# Patient Record
Sex: FEMALE | Race: WHITE | NOT HISPANIC OR LATINO
[De-identification: names, ages, dates, MRNs, and addresses within clinical notes are randomized per-mention and may not be internally consistent; named-entity substitution may affect disease eponyms.]

---

## 2018-04-02 ENCOUNTER — IMAGING SERVICES (OUTPATIENT)
Dept: OTHER | Age: 48
End: 2018-04-02

## 2018-04-02 ENCOUNTER — CHARTING TRANS (OUTPATIENT)
Dept: OTHER | Age: 48
End: 2018-04-02

## 2018-04-16 ENCOUNTER — CHARTING TRANS (OUTPATIENT)
Dept: OTHER | Age: 48
End: 2018-04-16

## 2018-04-17 ENCOUNTER — CHARTING TRANS (OUTPATIENT)
Dept: OTHER | Age: 48
End: 2018-04-17

## 2018-04-20 ENCOUNTER — CHARTING TRANS (OUTPATIENT)
Dept: OTHER | Age: 48
End: 2018-04-20

## 2018-04-23 ENCOUNTER — CHARTING TRANS (OUTPATIENT)
Dept: OTHER | Age: 48
End: 2018-04-23

## 2018-05-02 ENCOUNTER — CHARTING TRANS (OUTPATIENT)
Dept: OTHER | Age: 48
End: 2018-05-02

## 2018-05-02 ENCOUNTER — MYAURORA ACCOUNT LINK (OUTPATIENT)
Dept: OTHER | Age: 48
End: 2018-05-02

## 2018-05-04 ENCOUNTER — MYAURORA ACCOUNT LINK (OUTPATIENT)
Dept: OTHER | Age: 48
End: 2018-05-04

## 2018-05-04 ENCOUNTER — CHARTING TRANS (OUTPATIENT)
Dept: OTHER | Age: 48
End: 2018-05-04

## 2018-05-09 ENCOUNTER — CHARTING TRANS (OUTPATIENT)
Dept: OTHER | Age: 48
End: 2018-05-09

## 2018-05-16 ENCOUNTER — IMAGING SERVICES (OUTPATIENT)
Dept: OTHER | Age: 48
End: 2018-05-16

## 2018-05-18 ENCOUNTER — CHARTING TRANS (OUTPATIENT)
Dept: OTHER | Age: 48
End: 2018-05-18

## 2018-06-06 ENCOUNTER — CHARTING TRANS (OUTPATIENT)
Dept: OTHER | Age: 48
End: 2018-06-06

## 2018-07-06 ENCOUNTER — CHARTING TRANS (OUTPATIENT)
Dept: OTHER | Age: 48
End: 2018-07-06

## 2018-07-06 ENCOUNTER — MYAURORA ACCOUNT LINK (OUTPATIENT)
Dept: OTHER | Age: 48
End: 2018-07-06

## 2018-07-11 ENCOUNTER — CHARTING TRANS (OUTPATIENT)
Dept: OTHER | Age: 48
End: 2018-07-11

## 2019-03-06 VITALS — HEIGHT: 64 IN | BODY MASS INDEX: 31.07 KG/M2

## 2019-06-05 ENCOUNTER — OFFICE VISIT (OUTPATIENT)
Dept: INTERNAL MEDICINE | Age: 49
End: 2019-06-05

## 2019-06-05 ENCOUNTER — LAB SERVICES (OUTPATIENT)
Dept: LAB | Age: 49
End: 2019-06-05

## 2019-06-05 VITALS
WEIGHT: 197 LBS | RESPIRATION RATE: 18 BRPM | SYSTOLIC BLOOD PRESSURE: 118 MMHG | BODY MASS INDEX: 33.63 KG/M2 | TEMPERATURE: 98.8 F | HEART RATE: 96 BPM | HEIGHT: 64 IN | DIASTOLIC BLOOD PRESSURE: 72 MMHG

## 2019-06-05 DIAGNOSIS — R60.0 LOCALIZED EDEMA: Primary | ICD-10-CM

## 2019-06-05 DIAGNOSIS — R60.0 LOCALIZED EDEMA: ICD-10-CM

## 2019-06-05 LAB
BUN SERPL-MCNC: 9 MG/DL (ref 7–20)
CALCIUM SERPL-MCNC: 9.5 MG/DL (ref 8.6–10.6)
CHLORIDE SERPL-SCNC: 103 MMOL/L (ref 96–107)
CO2 SERPL-SCNC: 25 MMOL/L (ref 22–32)
CREAT SERPL-MCNC: 0.8 MG/DL (ref 0.5–1.4)
GFR SERPL CREATININE-BSD FRML MDRD: >60 ML/MIN/{1.73M2}
GFR SERPL CREATININE-BSD FRML MDRD: >60 ML/MIN/{1.73M2}
GLUCOSE SERPL-MCNC: 147 MG/DL (ref 70–200)
POTASSIUM SERPL-SCNC: 4.5 MMOL/L (ref 3.5–5.3)
SODIUM SERPL-SCNC: 138 MMOL/L (ref 136–146)

## 2019-06-05 PROCEDURE — 36415 COLL VENOUS BLD VENIPUNCTURE: CPT | Performed by: PHYSICIAN ASSISTANT

## 2019-06-05 PROCEDURE — 99203 OFFICE O/P NEW LOW 30 MIN: CPT | Performed by: PHYSICIAN ASSISTANT

## 2019-06-05 PROCEDURE — 82043 UR ALBUMIN QUANTITATIVE: CPT | Performed by: PHYSICIAN ASSISTANT

## 2019-06-05 PROCEDURE — 80048 BASIC METABOLIC PNL TOTAL CA: CPT | Performed by: PHYSICIAN ASSISTANT

## 2019-06-05 PROCEDURE — 82570 ASSAY OF URINE CREATININE: CPT | Performed by: PHYSICIAN ASSISTANT

## 2019-06-06 LAB
ALBUMIN/CREAT UR: 4.7 MG/G (ref 0–30)
CREAT UR-MCNC: 105.5 MG/DL
MICROALBUMIN UR-MCNC: 5 MG/L

## 2019-10-29 ENCOUNTER — OFFICE VISIT (OUTPATIENT)
Dept: INTERNAL MEDICINE | Age: 49
End: 2019-10-29

## 2019-10-29 VITALS
DIASTOLIC BLOOD PRESSURE: 84 MMHG | OXYGEN SATURATION: 98 % | SYSTOLIC BLOOD PRESSURE: 126 MMHG | HEART RATE: 95 BPM | TEMPERATURE: 98.3 F

## 2019-10-29 DIAGNOSIS — J40 BRONCHITIS: Primary | ICD-10-CM

## 2019-10-29 PROCEDURE — 99214 OFFICE O/P EST MOD 30 MIN: CPT | Performed by: PHYSICIAN ASSISTANT

## 2019-10-29 RX ORDER — AZITHROMYCIN 250 MG/1
TABLET, FILM COATED ORAL
Qty: 6 TABLET | Refills: 0 | Status: SHIPPED | OUTPATIENT
Start: 2019-10-29 | End: 2020-04-02 | Stop reason: ALTCHOICE

## 2019-10-29 RX ORDER — CODEINE PHOSPHATE AND GUAIFENESIN 10; 100 MG/5ML; MG/5ML
5 SOLUTION ORAL 3 TIMES DAILY PRN
Qty: 120 ML | Refills: 0 | Status: SHIPPED | OUTPATIENT
Start: 2019-10-29 | End: 2020-04-02 | Stop reason: ALTCHOICE

## 2020-01-31 ENCOUNTER — E-ADVICE (OUTPATIENT)
Dept: INTERNAL MEDICINE | Age: 50
End: 2020-01-31

## 2020-04-02 ENCOUNTER — OFFICE VISIT (OUTPATIENT)
Dept: INTERNAL MEDICINE | Age: 50
End: 2020-04-02

## 2020-04-02 DIAGNOSIS — Z71.6 ENCOUNTER FOR SMOKING CESSATION COUNSELING: Primary | ICD-10-CM

## 2020-04-02 PROBLEM — M25.572 CHRONIC PAIN OF LEFT ANKLE: Status: ACTIVE | Noted: 2018-05-18

## 2020-04-02 PROBLEM — R26.2 DIFFICULTY WALKING: Status: ACTIVE | Noted: 2018-04-17

## 2020-04-02 PROBLEM — G89.29 CHRONIC PAIN OF LEFT ANKLE: Status: ACTIVE | Noted: 2018-05-18

## 2020-04-02 PROBLEM — B35.1 DERMATOPHYTOSIS OF NAIL: Status: ACTIVE | Noted: 2018-04-16

## 2020-04-02 PROBLEM — M65.9 SYNOVITIS OF ANKLE: Status: ACTIVE | Noted: 2018-04-02

## 2020-04-02 PROBLEM — M62.81 MUSCLE WEAKNESS: Status: ACTIVE | Noted: 2018-04-17

## 2020-04-02 PROBLEM — M79.675 GREAT TOE PAIN, LEFT: Status: ACTIVE | Noted: 2018-04-16

## 2020-04-02 PROBLEM — M65.979 SYNOVITIS OF ANKLE: Status: ACTIVE | Noted: 2018-04-02

## 2020-04-02 PROBLEM — S93.492A SPRAIN OF ANTERIOR TALOFIBULAR LIGAMENT OF LEFT ANKLE: Status: ACTIVE | Noted: 2018-04-02

## 2020-04-02 PROBLEM — L60.3 NAIL DYSTROPHY: Status: ACTIVE | Noted: 2018-04-16

## 2020-04-02 PROCEDURE — 99442 TELEPHONE E&M BY PHYSICIAN EST PT NOT ORIG PREV 7 DAYS 11-20 MIN: CPT | Performed by: PHYSICIAN ASSISTANT

## 2020-04-02 RX ORDER — VARENICLINE TARTRATE 1 MG/1
1 TABLET, FILM COATED ORAL 2 TIMES DAILY
Qty: 60 TABLET | Refills: 1 | Status: SHIPPED | OUTPATIENT
Start: 2020-04-02 | End: 2022-09-29 | Stop reason: ALTCHOICE

## 2020-05-05 ENCOUNTER — V-VISIT (OUTPATIENT)
Dept: INTERNAL MEDICINE | Age: 50
End: 2020-05-05

## 2020-05-05 ENCOUNTER — E-ADVICE (OUTPATIENT)
Dept: INTERNAL MEDICINE | Age: 50
End: 2020-05-05

## 2020-05-05 DIAGNOSIS — L03.032 PARONYCHIA OF GREAT TOE OF LEFT FOOT: Primary | ICD-10-CM

## 2020-05-05 PROCEDURE — 99214 OFFICE O/P EST MOD 30 MIN: CPT | Performed by: PHYSICIAN ASSISTANT

## 2020-05-05 RX ORDER — CEPHALEXIN 500 MG/1
500 CAPSULE ORAL 4 TIMES DAILY
Qty: 28 CAPSULE | Refills: 0 | Status: SHIPPED | OUTPATIENT
Start: 2020-05-05 | End: 2020-05-12

## 2020-05-05 ASSESSMENT — PATIENT HEALTH QUESTIONNAIRE - PHQ9
SUM OF ALL RESPONSES TO PHQ9 QUESTIONS 1 AND 2: 0
1. LITTLE INTEREST OR PLEASURE IN DOING THINGS: NOT AT ALL
SUM OF ALL RESPONSES TO PHQ9 QUESTIONS 1 AND 2: 0
2. FEELING DOWN, DEPRESSED OR HOPELESS: NOT AT ALL

## 2020-10-19 ENCOUNTER — WALK IN (OUTPATIENT)
Dept: URGENT CARE | Age: 50
End: 2020-10-19

## 2020-10-19 DIAGNOSIS — Z20.828 CONTACT W AND EXPOSURE TO OTH VIRAL COMMUNICABLE DISEASES: ICD-10-CM

## 2020-10-19 DIAGNOSIS — R05.9 COUGH: Primary | ICD-10-CM

## 2020-10-19 DIAGNOSIS — R09.81 SINUS CONGESTION: ICD-10-CM

## 2020-10-19 LAB — SARS-COV-2 AG RESP QL IA.RAPID: NOT DETECTED

## 2020-10-19 PROCEDURE — 87426 SARSCOV CORONAVIRUS AG IA: CPT | Performed by: FAMILY MEDICINE

## 2020-10-19 PROCEDURE — 99203 OFFICE O/P NEW LOW 30 MIN: CPT | Performed by: FAMILY MEDICINE

## 2022-09-29 ENCOUNTER — OFFICE VISIT (OUTPATIENT)
Dept: INTERNAL MEDICINE | Age: 52
End: 2022-09-29

## 2022-09-29 VITALS
TEMPERATURE: 98.2 F | WEIGHT: 209.2 LBS | OXYGEN SATURATION: 100 % | DIASTOLIC BLOOD PRESSURE: 86 MMHG | BODY MASS INDEX: 35.71 KG/M2 | RESPIRATION RATE: 16 BRPM | HEIGHT: 64 IN | HEART RATE: 87 BPM | SYSTOLIC BLOOD PRESSURE: 136 MMHG

## 2022-09-29 DIAGNOSIS — L98.8 SKIN PLAQUE: ICD-10-CM

## 2022-09-29 DIAGNOSIS — R12 HEARTBURN: Primary | ICD-10-CM

## 2022-09-29 PROCEDURE — 99214 OFFICE O/P EST MOD 30 MIN: CPT | Performed by: PHYSICIAN ASSISTANT

## 2022-09-29 RX ORDER — BETAMETHASONE DIPROPIONATE 0.05 %
1 OINTMENT (GRAM) TOPICAL 2 TIMES DAILY
Qty: 30 G | Refills: 1 | Status: SHIPPED | OUTPATIENT
Start: 2022-09-29 | End: 2023-04-21

## 2022-09-29 RX ORDER — OMEPRAZOLE 40 MG/1
40 CAPSULE, DELAYED RELEASE ORAL DAILY
Qty: 30 CAPSULE | Refills: 1 | Status: SHIPPED | OUTPATIENT
Start: 2022-09-29 | End: 2022-12-27

## 2022-09-29 ASSESSMENT — PATIENT HEALTH QUESTIONNAIRE - PHQ9
SUM OF ALL RESPONSES TO PHQ9 QUESTIONS 1 AND 2: 0
2. FEELING DOWN, DEPRESSED OR HOPELESS: NOT AT ALL
CLINICAL INTERPRETATION OF PHQ2 SCORE: NO FURTHER SCREENING NEEDED
SUM OF ALL RESPONSES TO PHQ9 QUESTIONS 1 AND 2: 0
1. LITTLE INTEREST OR PLEASURE IN DOING THINGS: NOT AT ALL

## 2022-10-03 ENCOUNTER — APPOINTMENT (OUTPATIENT)
Dept: DERMATOLOGY | Age: 52
End: 2022-10-03

## 2022-12-05 ENCOUNTER — OFFICE VISIT (OUTPATIENT)
Dept: DERMATOLOGY | Age: 52
End: 2022-12-05
Attending: PHYSICIAN ASSISTANT

## 2022-12-05 DIAGNOSIS — L30.9 ECZEMA, UNSPECIFIED TYPE: Primary | ICD-10-CM

## 2022-12-05 PROCEDURE — 99203 OFFICE O/P NEW LOW 30 MIN: CPT | Performed by: DERMATOLOGY

## 2022-12-05 RX ORDER — HALOBETASOL PROPIONATE 0.05 %
OINTMENT (GRAM) TOPICAL 2 TIMES DAILY
Qty: 50 G | Refills: 1 | Status: SHIPPED | OUTPATIENT
Start: 2022-12-05 | End: 2023-07-19 | Stop reason: SDUPTHER

## 2022-12-27 RX ORDER — OMEPRAZOLE 40 MG/1
CAPSULE, DELAYED RELEASE ORAL
Qty: 30 CAPSULE | Refills: 11 | Status: SHIPPED | OUTPATIENT
Start: 2022-12-27

## 2023-02-08 ENCOUNTER — APPOINTMENT (OUTPATIENT)
Dept: GASTROENTEROLOGY | Age: 53
End: 2023-02-08
Attending: PHYSICIAN ASSISTANT

## 2023-04-21 RX ORDER — BETAMETHASONE DIPROPIONATE 0.05 %
OINTMENT (GRAM) TOPICAL
Qty: 30 G | Refills: 1 | Status: SHIPPED | OUTPATIENT
Start: 2023-04-21 | End: 2023-08-14

## 2023-07-19 RX ORDER — HALOBETASOL PROPIONATE 0.05 %
OINTMENT (GRAM) TOPICAL 2 TIMES DAILY
Qty: 50 G | Refills: 0 | Status: SHIPPED | OUTPATIENT
Start: 2023-07-19

## 2023-07-26 ENCOUNTER — TELEPHONE (OUTPATIENT)
Dept: GASTROENTEROLOGY | Age: 53
End: 2023-07-26

## 2023-07-26 ENCOUNTER — OFFICE VISIT (OUTPATIENT)
Dept: GASTROENTEROLOGY | Age: 53
End: 2023-07-26
Attending: PHYSICIAN ASSISTANT

## 2023-07-26 VITALS — HEIGHT: 64 IN | WEIGHT: 178 LBS | BODY MASS INDEX: 30.39 KG/M2

## 2023-07-26 DIAGNOSIS — K21.9 CHRONIC GERD: Primary | ICD-10-CM

## 2023-07-26 DIAGNOSIS — Z80.0 FHX: COLON CANCER: ICD-10-CM

## 2023-07-26 DIAGNOSIS — Z12.11 SPECIAL SCREENING FOR MALIGNANT NEOPLASMS, COLON: ICD-10-CM

## 2023-07-26 PROCEDURE — 99204 OFFICE O/P NEW MOD 45 MIN: CPT | Performed by: INTERNAL MEDICINE

## 2023-07-26 ASSESSMENT — ENCOUNTER SYMPTOMS
BLOOD IN STOOL: 0
COLOR CHANGE: 0
CONFUSION: 0
RECTAL PAIN: 0
APPETITE CHANGE: 0
DIARRHEA: 0
SHORTNESS OF BREATH: 0
ABDOMINAL PAIN: 0
CHEST TIGHTNESS: 0
HEADACHES: 0
BACK PAIN: 0
SPEECH DIFFICULTY: 0
CHOKING: 0
ABDOMINAL DISTENTION: 0
TREMORS: 0
CHILLS: 0
CONSTIPATION: 0
SORE THROAT: 0
UNEXPECTED WEIGHT CHANGE: 0
EYE PAIN: 0
FATIGUE: 0
SEIZURES: 0
LIGHT-HEADEDNESS: 0
BRUISES/BLEEDS EASILY: 0
ANAL BLEEDING: 0
DIAPHORESIS: 0
NAUSEA: 0
EYE REDNESS: 0
VOMITING: 0
COUGH: 0

## 2023-08-14 RX ORDER — BETAMETHASONE DIPROPIONATE 0.05 %
OINTMENT (GRAM) TOPICAL
Qty: 30 G | Refills: 1 | Status: SHIPPED | OUTPATIENT
Start: 2023-08-14

## 2023-09-19 ENCOUNTER — E-ADVICE (OUTPATIENT)
Dept: GASTROENTEROLOGY | Age: 53
End: 2023-09-19

## 2023-09-19 RX ORDER — BISACODYL 5 MG/1
TABLET, DELAYED RELEASE ORAL
Qty: 2 TABLET | Refills: 0 | Status: SHIPPED | OUTPATIENT
Start: 2023-09-19 | End: 2023-11-03

## 2023-09-19 RX ORDER — SIMETHICONE 125 MG
TABLET,CHEWABLE ORAL
Qty: 2 TABLET | Refills: 0 | Status: SHIPPED | OUTPATIENT
Start: 2023-09-19 | End: 2023-11-03

## 2023-11-02 ENCOUNTER — E-ADVICE (OUTPATIENT)
Dept: FAMILY MEDICINE | Age: 53
End: 2023-11-02

## 2023-11-03 NOTE — LETTER
25          Charlotte Laugh Robbie  :  1970      To Whom It May Concern:    This patient was seen in our office on 25 .  Work status:  {JOEL RETURN TO WORK:3391} May return to work status per above effective ***.    If this office may be of further assistance, please do not hesitate to contact us.      Sincerely,        Roxana Silva     present done

## 2023-11-08 RX ORDER — BETAMETHASONE DIPROPIONATE 0.05 %
OINTMENT (GRAM) TOPICAL
Qty: 30 G | Refills: 1 | OUTPATIENT
Start: 2023-11-08

## 2024-01-04 ENCOUNTER — E-ADVICE (OUTPATIENT)
Dept: FAMILY MEDICINE | Age: 54
End: 2024-01-04

## 2024-01-08 RX ORDER — OMEPRAZOLE 40 MG/1
CAPSULE, DELAYED RELEASE ORAL
Qty: 90 CAPSULE | Refills: 3 | OUTPATIENT
Start: 2024-01-08

## 2024-01-16 ENCOUNTER — ANESTHESIA EVENT (OUTPATIENT)
Dept: GASTROENTEROLOGY | Age: 54
End: 2024-01-16

## 2024-01-17 ENCOUNTER — ANESTHESIA (OUTPATIENT)
Dept: GASTROENTEROLOGY | Age: 54
End: 2024-01-17

## 2024-01-17 ENCOUNTER — APPOINTMENT (OUTPATIENT)
Dept: GASTROENTEROLOGY | Age: 54
End: 2024-01-17
Attending: INTERNAL MEDICINE

## 2024-01-17 VITALS
SYSTOLIC BLOOD PRESSURE: 138 MMHG | HEART RATE: 84 BPM | BODY MASS INDEX: 30.39 KG/M2 | OXYGEN SATURATION: 99 % | DIASTOLIC BLOOD PRESSURE: 91 MMHG | WEIGHT: 178 LBS | RESPIRATION RATE: 20 BRPM | HEIGHT: 64 IN | TEMPERATURE: 97 F

## 2024-01-17 DIAGNOSIS — K63.5 POLYP OF COLON, UNSPECIFIED PART OF COLON, UNSPECIFIED TYPE: Primary | ICD-10-CM

## 2024-01-17 DIAGNOSIS — Z12.11 SPECIAL SCREENING FOR MALIGNANT NEOPLASMS, COLON: ICD-10-CM

## 2024-01-17 DIAGNOSIS — K21.9 CHRONIC GERD: ICD-10-CM

## 2024-01-17 LAB
B-HCG UR QL: NEGATIVE
COLONOSCOPY STUDY: NORMAL
INTERNAL PROCEDURAL CONTROLS ACCEPTABLE: YES
TEST LOT EXPIRATION DATE: NORMAL
TEST LOT NUMBER: NORMAL

## 2024-01-17 PROCEDURE — 45385 COLONOSCOPY W/LESION REMOVAL: CPT | Performed by: INTERNAL MEDICINE

## 2024-01-17 PROCEDURE — 43239 EGD BIOPSY SINGLE/MULTIPLE: CPT | Performed by: CLINIC/CENTER

## 2024-01-17 PROCEDURE — 45385 COLONOSCOPY W/LESION REMOVAL: CPT | Performed by: CLINIC/CENTER

## 2024-01-17 PROCEDURE — 88305 TISSUE EXAM BY PATHOLOGIST: CPT | Performed by: INTERNAL MEDICINE

## 2024-01-17 PROCEDURE — 43239 EGD BIOPSY SINGLE/MULTIPLE: CPT | Performed by: INTERNAL MEDICINE

## 2024-01-17 PROCEDURE — 45380 COLONOSCOPY AND BIOPSY: CPT | Performed by: CLINIC/CENTER

## 2024-01-17 PROCEDURE — 45380 COLONOSCOPY AND BIOPSY: CPT | Performed by: INTERNAL MEDICINE

## 2024-01-17 RX ORDER — SODIUM CHLORIDE 9 MG/ML
INJECTION, SOLUTION INTRAVENOUS CONTINUOUS
Status: DISCONTINUED | OUTPATIENT
Start: 2024-01-17 | End: 2024-01-19 | Stop reason: HOSPADM

## 2024-01-17 RX ORDER — LIDOCAINE HYDROCHLORIDE 10 MG/ML
INJECTION, SOLUTION INFILTRATION; PERINEURAL PRN
Status: DISCONTINUED | OUTPATIENT
Start: 2024-01-17 | End: 2024-01-17

## 2024-01-17 RX ORDER — SODIUM CHLORIDE, SODIUM LACTATE, POTASSIUM CHLORIDE, CALCIUM CHLORIDE 600; 310; 30; 20 MG/100ML; MG/100ML; MG/100ML; MG/100ML
INJECTION, SOLUTION INTRAVENOUS CONTINUOUS
Status: DISCONTINUED | OUTPATIENT
Start: 2024-01-17 | End: 2024-01-19 | Stop reason: HOSPADM

## 2024-01-17 RX ORDER — DEXTROSE MONOHYDRATE 50 MG/ML
INJECTION, SOLUTION INTRAVENOUS CONTINUOUS PRN
Status: DISCONTINUED | OUTPATIENT
Start: 2024-01-17 | End: 2024-01-19 | Stop reason: HOSPADM

## 2024-01-17 RX ORDER — LIDOCAINE HYDROCHLORIDE 10 MG/ML
5-10 INJECTION, SOLUTION INFILTRATION; PERINEURAL PRN
Status: DISCONTINUED | OUTPATIENT
Start: 2024-01-17 | End: 2024-01-19 | Stop reason: HOSPADM

## 2024-01-17 RX ORDER — PROPOFOL 10 MG/ML
INJECTION, EMULSION INTRAVENOUS PRN
Status: DISCONTINUED | OUTPATIENT
Start: 2024-01-17 | End: 2024-01-17

## 2024-01-17 RX ADMIN — PROPOFOL 100 MG: 10 INJECTION, EMULSION INTRAVENOUS at 09:40

## 2024-01-17 RX ADMIN — SODIUM CHLORIDE, SODIUM LACTATE, POTASSIUM CHLORIDE, CALCIUM CHLORIDE: 600; 310; 30; 20 INJECTION, SOLUTION INTRAVENOUS at 09:35

## 2024-01-17 RX ADMIN — PROPOFOL 100 MG: 10 INJECTION, EMULSION INTRAVENOUS at 09:46

## 2024-01-17 RX ADMIN — PROPOFOL 100 MG: 10 INJECTION, EMULSION INTRAVENOUS at 09:57

## 2024-01-17 RX ADMIN — LIDOCAINE HYDROCHLORIDE 50 MG: 10 INJECTION, SOLUTION INFILTRATION; PERINEURAL at 09:40

## 2024-01-17 RX ADMIN — PROPOFOL 150 MG: 10 INJECTION, EMULSION INTRAVENOUS at 10:02

## 2024-01-17 RX ADMIN — PROPOFOL 100 MG: 10 INJECTION, EMULSION INTRAVENOUS at 09:51

## 2024-01-17 ASSESSMENT — PAIN SCALES - GENERAL
PAINLEVEL_OUTOF10: 0
PAINLEVEL_OUTOF10: 0

## 2024-01-17 ASSESSMENT — LIFESTYLE VARIABLES: SMOKING_STATUS: CURRENT

## 2024-01-17 ASSESSMENT — ACTIVITIES OF DAILY LIVING (ADL)
ADL_SCORE: 12
ADL_BEFORE_ADMISSION: INDEPENDENT

## 2024-01-17 ASSESSMENT — ENCOUNTER SYMPTOMS: EXERCISE TOLERANCE: GOOD (>4 METS)

## 2024-01-22 LAB
ASR DISCLAIMER: NORMAL
CASE RPRT: NORMAL
CLINICAL INFO: NORMAL
PATH REPORT.FINAL DX SPEC: NORMAL
PATH REPORT.GROSS SPEC: NORMAL

## 2024-01-31 ENCOUNTER — CLINICAL DOCUMENTATION (OUTPATIENT)
Dept: GASTROENTEROLOGY | Age: 54
End: 2024-01-31

## 2024-02-28 RX ORDER — PANTOPRAZOLE SODIUM 40 MG/1
40 TABLET, DELAYED RELEASE ORAL DAILY
Qty: 90 TABLET | Refills: 3 | Status: SHIPPED | OUTPATIENT
Start: 2024-02-28

## 2024-08-05 ENCOUNTER — E-ADVICE (OUTPATIENT)
Dept: INTERNAL MEDICINE | Age: 54
End: 2024-08-05

## 2024-08-07 ENCOUNTER — HOSPITAL ENCOUNTER (OUTPATIENT)
Dept: GENERAL RADIOLOGY | Age: 54
Discharge: HOME | End: 2024-08-07
Attending: FAMILY MEDICINE
Payer: COMMERCIAL

## 2024-08-07 ENCOUNTER — OFFICE VISIT (OUTPATIENT)
Dept: FAMILY MEDICINE CLINIC | Facility: CLINIC | Age: 54
End: 2024-08-07
Payer: COMMERCIAL

## 2024-08-07 VITALS
HEART RATE: 94 BPM | TEMPERATURE: 98 F | WEIGHT: 210 LBS | RESPIRATION RATE: 18 BRPM | HEIGHT: 64 IN | DIASTOLIC BLOOD PRESSURE: 88 MMHG | SYSTOLIC BLOOD PRESSURE: 138 MMHG | OXYGEN SATURATION: 99 % | BODY MASS INDEX: 35.85 KG/M2

## 2024-08-07 DIAGNOSIS — M54.2 NECK PAIN: ICD-10-CM

## 2024-08-07 DIAGNOSIS — G89.29 CHRONIC SHOULDER PAIN, UNSPECIFIED LATERALITY: ICD-10-CM

## 2024-08-07 DIAGNOSIS — M54.2 NECK PAIN: Primary | ICD-10-CM

## 2024-08-07 DIAGNOSIS — M25.519 CHRONIC SHOULDER PAIN, UNSPECIFIED LATERALITY: ICD-10-CM

## 2024-08-07 PROBLEM — K21.9 CHRONIC GERD: Status: ACTIVE | Noted: 2023-07-26

## 2024-08-07 PROCEDURE — 99203 OFFICE O/P NEW LOW 30 MIN: CPT | Performed by: FAMILY MEDICINE

## 2024-08-07 PROCEDURE — 72050 X-RAY EXAM NECK SPINE 4/5VWS: CPT | Performed by: FAMILY MEDICINE

## 2024-08-07 RX ORDER — CYCLOBENZAPRINE HCL 10 MG
10 TABLET ORAL NIGHTLY PRN
Qty: 10 TABLET | Refills: 0 | Status: SHIPPED | OUTPATIENT
Start: 2024-08-07

## 2024-08-07 RX ORDER — BETAMETHASONE DIPROPIONATE 0.05 %
1 OINTMENT (GRAM) TOPICAL 2 TIMES DAILY
COMMUNITY
Start: 2023-08-14 | End: 2024-08-07

## 2024-08-07 RX ORDER — PANTOPRAZOLE SODIUM 40 MG/1
40 TABLET, DELAYED RELEASE ORAL DAILY
COMMUNITY

## 2024-08-07 RX ORDER — HALOBETASOL PROPIONATE 0.05 %
OINTMENT (GRAM) TOPICAL 2 TIMES DAILY
COMMUNITY
Start: 2023-07-19

## 2024-08-07 RX ORDER — PREDNISONE 20 MG/1
50 TABLET ORAL DAILY
Qty: 13 TABLET | Refills: 0 | Status: SHIPPED | OUTPATIENT
Start: 2024-08-07 | End: 2024-08-12

## 2024-08-07 NOTE — PROGRESS NOTES
Charlotte Avalos is a 54 year old female.   Chief Complaint   Patient presents with    Shoulder Pain     With neck pain on the left side     HPI:    54-year-old female comes into the office secondary to having shoulder pain on her left as well as neck pain on the left side.  Patient states that she has a history of bulging disc for which she has had multiple surgeries in the past.  Patient states that in the last week she has had this new kind of pain going down the left arm causing tingling sensation in her fingers.  Patient denies any nausea or vomiting or any new trauma.  Patient is concerned that she may have her disc bulging again causing the symptoms.  No past medical history on file.  Past Surgical History:   Procedure Laterality Date    Back surgery  2013    cer fusion revision     Spinal fusion  [id number]    C4-5,C5-6, C6-7     No family history on file.  Social History:  Social History     Socioeconomic History    Marital status:    Tobacco Use    Smoking status: Every Day     Current packs/day: 0.50     Types: Cigarettes    Smokeless tobacco: Never   Substance and Sexual Activity    Alcohol use: Yes     Comment: socially    Drug use: No     Allergies:  No Known Allergies   Current Meds:  Current Outpatient Medications   Medication Sig Dispense Refill    Halobetasol Propionate 0.05 % External Ointment Apply topically 2 (two) times daily.      predniSONE 20 MG Oral Tab Take 2.5 tablets (50 mg total) by mouth daily for 5 days. 13 tablet 0    cyclobenzaprine 10 MG Oral Tab Take 1 tablet (10 mg total) by mouth nightly as needed for Muscle spasms. 10 tablet 0    pantoprazole 40 MG Oral Tab EC Take 1 tablet (40 mg total) by mouth daily.          ROS:   GENERAL HEALTH: feels well otherwise  SKIN: denies any unusual skin lesions or rashes  RESPIRATORY: denies shortness of breath with exertion  CARDIOVASCULAR: denies chest pain on exertion  GI: denies abdominal pain and denies heartburn  NEURO: denies  headaches    PHYSICAL EXAM:   /88   Pulse 94   Temp 97.7 °F (36.5 °C)   Resp 18   Ht 5' 4\" (1.626 m)   Wt 210 lb (95.3 kg)   SpO2 99%   BMI 36.05 kg/m²   GENERAL HEALTH: well developed, well nourished, in no apparent distress  EYES: sclera anicteric, conjunctiva normal  HEENT: normocephalic; normal pharynx  NECK: supple; no JVD, no LAD, trapezium spasm noted limited neck mobility towards the right and left..  Slightly diminished left elbow reflex.  RESPIRATORY: clear to auscultation bilaterally, no tachypnea  CARDIOVASCULAR: S1, S2 normal, no S3, no S4; no click; no murmur  EXTREMITIES: no cyanosis, clubbing or edema, peripheral pulses intact  PSYCHIATRIC: alert and oriented x 3; affect appropriate      ASSESSMENT/ PLAN:     Diagnoses and all orders for this visit:    Neck pain  -     Pain Management Referral - In Network  -     XR CERVICAL SPINE (4VIEWS) (CPT=72050); Future    Chronic shoulder pain, unspecified laterality  -     Pain Management Referral - In Network    Other orders  -     predniSONE 20 MG Oral Tab; Take 2.5 tablets (50 mg total) by mouth daily for 5 days.  -     cyclobenzaprine 10 MG Oral Tab; Take 1 tablet (10 mg total) by mouth nightly as needed for Muscle spasms.    Concern for neuropathic/radiculopathic pain with this patient given her history.  At this time we will start with prednisone 50 mg daily as well as Flexeril I will get a film of her neck.  Will most likely need to visit with pain management to see if she is a candidate for more aggressive nonconservative treatment versus surgery again.  Patient agreeable with plan.    The patient is to return to office in prn  The patient is to return to office for persistent or worsening signs and symptoms.   The proper use of medication and possible side effects discussed with patient.  An AVS was given to patient.  The patient verbalized understanding, agrees to treatment regimen and all questions were answered.

## 2024-08-15 ENCOUNTER — TELEPHONE (OUTPATIENT)
Dept: PAIN CLINIC | Facility: CLINIC | Age: 54
End: 2024-08-15

## 2024-08-15 ENCOUNTER — OFFICE VISIT (OUTPATIENT)
Dept: PAIN CLINIC | Facility: CLINIC | Age: 54
End: 2024-08-15
Payer: COMMERCIAL

## 2024-08-15 VITALS
WEIGHT: 200 LBS | SYSTOLIC BLOOD PRESSURE: 130 MMHG | BODY MASS INDEX: 34 KG/M2 | OXYGEN SATURATION: 98 % | HEART RATE: 83 BPM | DIASTOLIC BLOOD PRESSURE: 86 MMHG

## 2024-08-15 DIAGNOSIS — Z98.1 HISTORY OF FUSION OF CERVICAL SPINE: ICD-10-CM

## 2024-08-15 DIAGNOSIS — M54.12 CERVICAL RADICULOPATHY: Primary | ICD-10-CM

## 2024-08-15 PROCEDURE — 99204 OFFICE O/P NEW MOD 45 MIN: CPT | Performed by: PHYSICIAN ASSISTANT

## 2024-08-15 RX ORDER — METHYLPREDNISOLONE 4 MG/1
TABLET ORAL
Qty: 21 TABLET | Refills: 0 | Status: SHIPPED | OUTPATIENT
Start: 2024-08-15

## 2024-08-15 NOTE — PROGRESS NOTES
Patient presents in office today with reported pain in L side of neck, L shoulder, radiating down L arm    Current pain level reported = 3/10    Last reported dose of NA today      Narcotic Contract renewal NA    Urine Drug screen NA

## 2024-08-15 NOTE — TELEPHONE ENCOUNTER
Patient called to inform office that her cervical MRI is scheduled for 9/19 at 4:15 pm. Patient does not need anything further at this time.

## 2024-08-15 NOTE — PROGRESS NOTES
Patient: Charlotte Avalos  Medical Record Number: [id number]  Site: [facility]  Referring Physician:  Aidan Bourgeois  PCP: same    Dear Dr. Keith Bourgeois:    Thank you very much for requesting this consultation. I had the opportunity to evaluate and initiate care for your patient today, as per your request.    HISTORY OF CHIEF COMPLAINT:      Charlotte Avalos is a 54 year old female, who complains of neck and radiating left scapular pain to the triceps and extensor surface of the forearm through the 3rd through 5th digits of hand.    Patient is here today at the request of her primary care physician with pain in above-described distribution that has been ongoing for years.  States that she has had multiple cervical surgeries the last of which was completed in 2002, 2003, 2007 and 2011, all with [facility] (Dr. Gunn).  States that the result of all of this surgery is a C4-7 fusion.  While she has been in pain \"every day\" since, it has been controlled with NSAIDs and CBD (no THC).      She has had a flare over the past month while taking out the garbage.  States that she had a sharp pain on the L side of neck with sharp pain and tingling/numbness into the UE to 3-5th digits.  She was seen by PCP, and was given round of prednisone and muscle relaxer.  The steroid was partially effective, though not curative.  Sent for XR, and here for further options.      VAS Pain Score:  3-7/10    Hand Dominance: right  Loss of dexterity: No  Dropping things: No    Aggravating Factors: Relieving Factors:   Use of L UE  ROM C spine Limiting ADLs     Past Treatment Attempted/Patient’s Response:  As above     No past medical history on file.   Past Surgical History:   Procedure Laterality Date    Back surgery  2013    cer fusion revision     Spinal fusion  [id number]    C4-5,C5-6, C6-7      No family history on file.   Social History     Socioeconomic History    Marital status:    Tobacco Use    Smoking status:  Every Day     Current packs/day: 0.50     Types: Cigarettes    Smokeless tobacco: Never   Substance and Sexual Activity    Alcohol use: Yes     Comment: socially    Drug use: No   Other Topics Concern    Caffeine Concern No    Exercise No      Current Medications:  Current Outpatient Medications   Medication Sig Dispense Refill    pantoprazole 40 MG Oral Tab EC Take 1 tablet (40 mg total) by mouth daily.      Halobetasol Propionate 0.05 % External Ointment Apply topically 2 (two) times daily.      cyclobenzaprine 10 MG Oral Tab Take 1 tablet (10 mg total) by mouth nightly as needed for Muscle spasms. 10 tablet 0        Functional Assessment: Patient reports that they are able to complete all of their ADL's such as eating, bathing, using the toilet, dressing and getting up from a bed or a chair independently.    Work History:  The patient currently works in insurance.    REVIEW OF SYSTEMS:   10 point review of systems is otherwise negative,unless otherwise in HPI.      Radiology/Lab Test Reviewed:  MRI C spine 2015:    C2-C3: Minimal posterior disc bulge that enhances on the posterior study, likely minimal reactive   enhancement. There is no central canal or foraminal stenosis. Mild left facet arthropathy.     C3-C4: Mild posterior disc bulge resulting in no central canal or foraminal stenosis.     C4-C5: Anterior discectomy with plate screw and interbody spacer stabilization. No radiographic   evidence for hardware failure. No abnormal enhancement on the postinfusion study.     C5-C6: Anterior fusion without posterior hardware. No central canal or foraminal stenosis and no   abnormal enhancement.     C6-C7: Solid osseous fusion without posterior hardware. Minimal posterior osseous ridging in the   midline and to the left of midline that indents the ventral thecal sac with no central canal or   foraminal stenosis. No abnormal enhancement following contrast administration.     C7-T1: Minimal posterior disc bulge  resulting in no central canal or foraminal stenosis. Mild right   facet arthropathy.     CBC:  No results found for: \"WBC\"No results found for: \"HEMOGLOBIN\"No results found for: \"PLT\"          PHYSICAL EXAMIMATION   PHYSICAL EXAMINATION: Charlotte Avalos is a 54 year old female who is observed sitting comfortably on a chair in the exam room alert and oriented times three. She looks consistent with her stated age.    Ht Readings from Last 1 Encounters:   08/07/24 64\"     Wt Readings from Last 1 Encounters:   08/15/24 200 lb (90.7 kg)     The patient is well developed, well nourished, well muscled, obese. She moves independently from sitting to standing with ease.       Coordination:  Well coordinated; able to engage in rapid alternating movements bilateral upper extremities    Tandem Walk: Able    ROM Cervical Spine:  See chart below:  Motion Right (+ or -) Left (+ or -)   Cervical flexion - +   Cervical extension - +   Cervical lateral bending - +   Cervical rotation - +     Integument:  Skin over area of cervical spine intact; no erythema, rashes, excoriations, lesions noted    Palpation:  See chart below:  Palpation of Right (+ or -) Left (+ or -)   Cervical Facets - -   Thoracic Facets - -   Paraspinal - -   Trapezius - -   Scapula - -   Occipital - -     Strength:  Strength deficits noted:  Diminished strength left hand with  and first fifth opposition (4/5), 5/5 otherwise.     Sensation:  Normal sensation noted to light touch and pressure throughout bilateral upper extremities.    Tests:  Test Right (+ or -) Left (+ or -)   Spurling - +   Maddox’s Test     Clonus       HEAD/NECK: Head is normocephalic, neck supple  EYES: EOMI, MARISA  LYMPH EXAM: There is no lymph edema in either lower extremity.  VASCULAR EXAM: Radial pulses are normal bilaterally, with good distal perfusion. No clubbing or cyanosis.  HEART: normal, regular, S1 and S2  LUNGS: CTA  MUSCULOSKELETAL: Smooth, pain-free ROM to bilateral  shoulders,elbows, wrists and digits.    Do you have any known blood/bleeding disorders?  No  Does patient currently take blood thinners?   None  Does patient currently take any antibiotics?   No    Patient is currently on pain medications:  No  Reason pain medications are prescribed: N/A  Pain medications are prescribed by: N/A  [place] Prescription Monitoring review: N/A  DIRE: N/A  Treatment decision: N/A    MEDICAL DECISION MAKING:   Impression: Left cervical radiculopathy, history of cervical fusion (C4-7)    20+ year history of chronic cervical issues, with 4 previous cervical surgeries, all with [facility] (Dr. Gunn).  Last surgery was in 2011, and has continued with daily pain complaints since.  Historically, this had been reasonably well-managed with NSAIDs and CBD ointments (no THC).  Has since had flare of radicular left upper extremity pain over the last month, associated with weakness of left  and first fifth opposition.  Pain is in a C7 versus a C8 distribution, and given her previous history of L4 through 7 fusion, I do fear that she could have developed adjacent level disease at the C7-T1.  Will have her go for an MRI of the cervical spine, with attention to the C7-T1 level.  Recommendations to follow based on findings.  She has had some improvement with p.o. steroid, and is requesting another round.  Did provide her with a second round of oral steroid.    Plan: Tapered p.o. steroid, update MRI cervical spine with and without contrast.  Follow-up after imaging for discussion of plan of care (PT versus injections versus return to surgery).    The patient indicates understanding of these issues and agrees to the plan.      Thank you very much.     Respectfully yours,    MAGALIS Noriega

## 2024-08-15 NOTE — PROGRESS NOTES
Location of Pain:  L side of neck, L shoulder, radiating down L arm     Date Pain Began: 2 weeks          Work Related:   No        Receiving Work Comp/Disability:   No    Numeric Rating Scale:  Pain at Present:  3                                                                                                            (No Pain) 0  to  10 (Worst Pain)                 Minimum Pain:   3  Maximum Pain  6    Distribution of Pain:    left    Quality of Pain:   sharp/stabbing and tingling    Origin of Pain:    Other Taking out the garbage    Aggravating Factors:    Other Movement    Past Treatments for Current Pain Condition:   Physical Therapy, Surgery, and Other Injections, Medications    Prior diagnostic testing for your pain:  Xray

## 2024-08-15 NOTE — PATIENT INSTRUCTIONS
Refill policies:    Allow 2-3 business days for refills; controlled substances may take longer.  Contact your pharmacy at least 5 days prior to running out of medication and have them send an electronic request or submit request through the “request refill” option in your [employer] account.  Refills are not addressed on weekends; covering physicians do not authorize routine medications on weekends.  No narcotics or controlled substances are refilled after noon on Fridays or by on call physicians.  By law, narcotics must be electronically prescribed.  A 30 day supply with no refills is the maximum allowed.  If your prescription is due for a refill, you may be due for a follow up appointment.  To best provide you care, patients receiving routine medications need to be seen at least once a year.  Patients receiving narcotic/controlled substance medications need to be seen at least once every 3 months.  In the event that your preferred pharmacy does not have the requested medication in stock (e.g. Backordered), it is your responsibility to find another pharmacy that has the requested medication available.  We will gladly send a new prescription to that pharmacy at your request.    Scheduling Tests:    If your physician has ordered radiology tests such as MRI or CT scans, please contact Central Scheduling at [phone] right away to schedule the test.  Once scheduled, the [facility] Centralized Referral Team will work with your insurance carrier to obtain pre-certification or prior authorization.  Depending on your insurance carrier, approval may take 3-10 days.  It is highly recommended patients assure they have received an authorization before having a test performed.  If test is done without insurance authorization, patient may be responsible for the entire amount billed.      Precertification and Prior Authorizations:  If your physician has recommended that you have a procedure or additional testing performed the [facility]  Centralized Referral Team will contact your insurance carrier to obtain pre-certification or prior authorization.    You are strongly encouraged to contact your insurance carrier to verify that your procedure/test has been approved and is a COVERED benefit.  Although the Counts include 234 beds at the [facility] Centralized Referral Team does its due diligence, the insurance carrier gives the disclaimer that \"Although the procedure is authorized, this does not guarantee payment.\"    Ultimately the patient is responsible for payment.   Thank you for your understanding in this matter.  Paperwork Completion:  If you require FMLA or disability paperwork for your recovery, please make sure to either drop it off or have it faxed to our office at [phone]. Be sure the form has your name and date of birth on it.  The form will be faxed to our Forms Department and they will complete it for you.  There is a 25$ fee for all forms that need to be filled out.  Please be aware there is a 10-14 day turnaround time.  You will need to sign a release of information (NASEEM) form if your paperwork does not come with one.  You may call the Forms Department with any questions at [phone].  Their fax number is [phone].

## 2024-09-19 ENCOUNTER — E-ADVICE (OUTPATIENT)
Dept: INTERNAL MEDICINE | Age: 54
End: 2024-09-19

## 2024-09-19 ENCOUNTER — HOSPITAL ENCOUNTER (OUTPATIENT)
Dept: MRI IMAGING | Age: 54
Discharge: HOME | End: 2024-09-19
Attending: PHYSICIAN ASSISTANT
Payer: COMMERCIAL

## 2024-09-19 DIAGNOSIS — M54.12 CERVICAL RADICULOPATHY: ICD-10-CM

## 2024-09-19 DIAGNOSIS — Z98.1 HISTORY OF FUSION OF CERVICAL SPINE: ICD-10-CM

## 2024-09-19 PROCEDURE — 72156 MRI NECK SPINE W/O & W/DYE: CPT | Performed by: PHYSICIAN ASSISTANT

## 2024-09-19 PROCEDURE — A9575 INJ GADOTERATE MEGLUMI 0.1ML: HCPCS | Performed by: PHYSICIAN ASSISTANT

## 2024-09-19 RX ORDER — GADOTERATE MEGLUMINE 376.9 MG/ML
20 INJECTION INTRAVENOUS
Status: COMPLETED | OUTPATIENT
Start: 2024-09-19 | End: 2024-09-19

## 2024-09-19 RX ADMIN — GADOTERATE MEGLUMINE 20 ML: 376.9 INJECTION INTRAVENOUS at 16:32:00

## 2024-09-26 ENCOUNTER — VIRTUAL PHONE E/M (OUTPATIENT)
Dept: PAIN CLINIC | Facility: CLINIC | Age: 54
End: 2024-09-26
Payer: COMMERCIAL

## 2024-09-26 DIAGNOSIS — M54.2 CHRONIC NECK PAIN: Primary | ICD-10-CM

## 2024-09-26 DIAGNOSIS — G89.29 CHRONIC NECK PAIN: Primary | ICD-10-CM

## 2024-09-26 DIAGNOSIS — Z98.1 HISTORY OF FUSION OF CERVICAL SPINE: ICD-10-CM

## 2024-09-26 PROCEDURE — 99213 OFFICE O/P EST LOW 20 MIN: CPT | Performed by: PHYSICIAN ASSISTANT

## 2024-09-26 NOTE — PROGRESS NOTES
Charlotte Avalos verbally consents to a tele Visit Check-In service on 09/26/24.    Duration of Service:  20 minutes    HPI:   Charlotte Avalos presents with complaints of Neck pain radiating to Left scapular region and triceps, with numbness and tingling rarely into the extensor surface of the forearm through the third and fifth digits of the hand .    The pain is described as moderate aching, shooting that is intermittent.  The patient’s activity level has remained the same since last visit.  The pain is worst unrelated to time of day.    Changes in condition/history since last visit: Here today via telephone encounter to discuss MRI findings.  Since initial visit, states that her left upper extremity pain is slightly less frequent, though does still continue with pain and tingling, as above.    Last procedure: N/A    date: N/A    Percentage of relief experienced from the procedure: N/A %    Duration of the relief: N/A    The following activities will increase the patient’s pain: ROM C spine, use of L UE    The following activities decrease the patient’s pain: limiting activity level    Functional Assessment: Patient reports that they are able to complete all of their ADL's such as eating, bathing, using the toilet, dressing and getting up from a bed or a chair independently.    Current Medications:  Current Outpatient Medications   Medication Sig Dispense Refill    methylPREDNISolone (MEDROL) 4 MG Oral Tablet Therapy Pack Take as directed 21 tablet 0    pantoprazole 40 MG Oral Tab EC Take 1 tablet (40 mg total) by mouth daily.      Halobetasol Propionate 0.05 % External Ointment Apply topically 2 (two) times daily.      cyclobenzaprine 10 MG Oral Tab Take 1 tablet (10 mg total) by mouth nightly as needed for Muscle spasms. 10 tablet 0          Reviewed Patient History Dated: 8/15/224 no changes noted    Physical Exam:   There were no vitals taken for this visit.  VAS Pain Score:  /10  General Appearance: Well developed, Well  nourished, Independent body habitus, Well groomed, Obese    Neurological Exam: WNL-Orientation to time, place and person, normal mood & effect, normal concentration & attention span  Inspection: n/a  Radiology/Lab Test Reviewed: MRI C spine:  CERVICAL DISC LEVELS:   C2-C3:  No significant disc/facet abnormality, spinal stenosis, or foraminal narrowing.   C3-C4:  Posterior disc osteophyte complex abuts the ventral thecal sac without significant central or foraminal stenosis.   C4-C5:  Central canal partially obscured by susceptibility artifact from fusion hardware.  No high-grade central or foraminal stenosis.   C5-C6:  Central canal partially obscured by susceptibility artifact from fusion hardware.  No high-grade central or foraminal stenosis.   C6-C7:  No significant disc/facet abnormality, spinal stenosis, or foraminal narrowing.   C7-T1:  No significant disc/facet abnormality, spinal stenosis, or foraminal narrowing.     No results found for: \"WBC\"No results found for: \"HEMOGLOBIN\"No results found for: \"PLT\"      Do you have any known blood/bleeding disorders?  No  Does patient currently take blood thinners?   None  Does patient currently take any antibiotics?   No  Patient educated and verbalized understanding.  Medical Decision Making:   Diagnosis:    Encounter Diagnoses   Name Primary?    Chronic neck pain Yes    History of fusion of cervical spine        Impression: Ongoing chronic neck pain with history of 4 cervical surgeries, and is fused from C4-5 through C6-7.  No areas of significant stenosis, central or foraminal, though again, does continue to complain of left upper extremity symptoms, though this seems to be improving from initial visit.  We did discuss options, and she certainly wishes to avoid additional surgeries, and does not want to consider injections.  To that end, order was placed to have her work with physical therapy, and was given a referral to acupuncture.  Will see her back on a as  needed basis, should symptoms persist.      Plan: Patient to follow up PRN.  Order written for physical therapy with HEP and acupuncture.    No orders of the defined types were placed in this encounter.      Meds & Refills for this Visit:  Requested Prescriptions      No prescriptions requested or ordered in this encounter       Imaging & Consults:  None    The patient indicates understanding of these issues and agrees to the plan.    MAGALIS Noriega

## 2024-10-01 ENCOUNTER — OFFICE VISIT (OUTPATIENT)
Dept: PAIN CLINIC | Facility: CLINIC | Age: 54
End: 2024-10-01
Payer: COMMERCIAL

## 2024-10-01 DIAGNOSIS — M54.2 CERVICALGIA: Primary | ICD-10-CM

## 2024-10-01 PROCEDURE — 97810 ACUP 1/> WO ESTIM 1ST 15 MIN: CPT | Performed by: ACUPUNCTURIST

## 2024-10-01 PROCEDURE — 97811 ACUP 1/> W/O ESTIM EA ADD 15: CPT | Performed by: ACUPUNCTURIST

## 2024-10-01 NOTE — PROGRESS NOTES
Charlotte Avalos is a 54 year old female Acupuncture Therapy.   Chief Complaint: Bilateral neck pain  Secondary Complaint: Acid Reflux    Self reported health status:     Patient reported severity of symptom(s) over the last 24 hours  With zero reporting no symptoms and 10 reporting the worst severity    Symptom 1: 5  Symptom 2: 0-1    Response to last TX: NA    HPI: Charlotte suffers from bilateral neck pain which is most pronounced when turning to Rt and the pain is felt on the LT whereupon she can have shooting pain. Periodically, she will get associated HA.  Additionally, the shoulders feel tight and ache.  She has a hx of multiple cervical spine surgeries and a C4-7 cervical fusion. Had surgeries 2002, 2003, 2007, 2011.  Takes NSAIDs and CBD which helps to a certain degree.  We discussed starting CBD clinic level 4.    Additionally, she has acid reflux which has been under control since January when she started medication.    Grissom shave periodic bilateral knee pain but it is not the major complaint.    Objective (Pulse, Tongue, Vitals): Pulse is thin and deeper. Trapezius is hypertonic and worse on the RT.    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 at [facility].    No follow-ups on file.    Charlie Kuhn L.AC  10/1/2024  5:34 PM

## 2024-10-01 NOTE — PATIENT INSTRUCTIONS
I suggest aplying [facility] clinic level 4 2 times per day to your neck.  It can purchased online at: [email or web address]

## 2024-10-03 ENCOUNTER — TELEPHONE (OUTPATIENT)
Dept: PHYSICAL THERAPY | Facility: HOSPITAL | Age: 54
End: 2024-10-03

## 2024-10-04 ENCOUNTER — OFFICE VISIT (OUTPATIENT)
Dept: PHYSICAL THERAPY | Age: 54
End: 2024-10-04
Attending: PHYSICIAN ASSISTANT
Payer: COMMERCIAL

## 2024-10-04 DIAGNOSIS — Z98.1 HISTORY OF FUSION OF CERVICAL SPINE: ICD-10-CM

## 2024-10-04 DIAGNOSIS — G89.29 CHRONIC NECK PAIN: Primary | ICD-10-CM

## 2024-10-04 DIAGNOSIS — M54.2 CHRONIC NECK PAIN: Primary | ICD-10-CM

## 2024-10-04 PROCEDURE — 97161 PT EVAL LOW COMPLEX 20 MIN: CPT

## 2024-10-04 PROCEDURE — 97110 THERAPEUTIC EXERCISES: CPT

## 2024-10-04 PROCEDURE — 97140 MANUAL THERAPY 1/> REGIONS: CPT

## 2024-10-04 NOTE — PATIENT INSTRUCTIONS
Thank you for coming to your physical therapy appt! During your appt today you were issued a HEP handout from [employer] which can also be accessed using the information below. The exercises chosen are meant to supplement the treatment you received and reinforce the progress made in physical therapy. It is important to stay consistent with your exercises to help facilitate and maximize your recovery!      Access Code: [email or web address]  URL: [email or web address]  Date: 10/04/2024  Prepared by: Natalie Flores    Exercises  - Supine Shoulder Horizontal Abduction with Resistance  - 1 x daily - 7 x weekly - 2 sets - 10 reps  - Seated Scapular Retraction  - 1 x daily - 7 x weekly - 2 sets - 10 reps - 2-3\" hold  - Standing Lumbar Spine Flexion Stretch Counter  - 1 x daily - 7 x weekly - 1 sets - 10 reps - 2-3\" hold

## 2024-10-04 NOTE — PROGRESS NOTES
PHYSICAL THERAPY INITIAL EVALUATION     Date of service: 10/4/2024  Dx: Chronic neck pain (M54.2,G89.29), History of fusion of cervical spine (Z98.1)       Insurance: [employer] [place] [employer]  Insurance Limits: 75 visit limit  Visit #: 1  Authorized # of Visits: 12 recommended  POC/Auth Expiration: N/A  Authorizing Physician/Provider: Naveed     PATIENT SUMMARY     History/RICK: \"I've had four surgeries. I have fusions at C4-C7 and I had one redo. I have used two cadaver bones and my hip bones. I have pain everyday from all of that, but it's usually pretty tolerable. A few months ago, I was taking out the garbage and I tweaked something in my neck.\" She had an MRI and it showed some mild bulging discs and some bone spurs. \"We are hoping to get to calm down with acupuncture and physical therapy.\" She had her first acupuncture treatment on Tuesday. She notes that she hasn't had a headache since then.     She reports a sharp pain at the base of the skull on the left side. Reports radicular symptoms travelling into the fourth and fifth fingers as well as the dorsal aspect of her hand.     DOI/S: 2002, 2003, 2007 and 2011-12.     Aggravating Factors: washing hair, driving long distances, checking blindspot, lying down will cause a headache (relieved with standing), sleeping     Alleviating Factors: electrical stimulation, heating pad     PMH: The patient's PMH was reviewed with the patient including allergies, medications, and surgical and medical history. Patient  has no past medical history on file. The patient reports that she has had previous PT for her neck after her multiple surgeries.     PLF/Personal Goals: The patient's primary goal is improved pain management. \"I can do pretty much anything I want to do, it's just how much pain I'm going to be in.\"     Occupation: [employer], work from home     OBJECTIVE:     Pain/Symptom Presentation:   Pain at rest: 4/10  Pain at worst: 8/10    Outcomes Measure(s):   NDI: 54%  disability    Activity Measures:  Headaches: Moderate Activity Limitation: Patient is with regular headaches, worse when lying down.   Dizziness: No Activity Limitation: Patient is without complaints of dizziness.   Sleeping: Moderate Activity Limitation: Patient is able to sleep up to 5-6 hours a night.   Checking Blindspot While Driving: Moderate Activity Limitation: Patient is with mobility limitations for turning her head to check her blindspot.   Carrying: Mild Activity Limitation: Patient is able to carry objects up to 15lbs.    Lifting: Mild Activity Limitation: Patient is able to lift objects up to 15lbs.    Pushing: Mild Activity Limitation: Patient is able to push objects up to 30lbs.    Pulling: Mild Activity Limitation: Patient is able to pull objects up to 30lbs.    Overhead Work: Moderate Activity Limitation: Patient is able to complete overhead work up to 20-30 minutes before disruption due to neck pain.     Inspection/Observation: Patient demonstrates sitting postural dysfunction with forward head posture, excessive neck protraction and thoracic kyphosis, anterior tilt and downward rotation of the scapula, internal rotation of shoulder, decreased scapular and abdominal tone, and reduced lumbar lordosis.    Palpation: Patient demonstrates increased resting muscle tone in the neck and suboccipital musculature.     ROM:   Cervical Motion AROM    Right Left   Cervical Flexion 40   Cervical Extension 30   Cervical Side Bending 5* 15*   Cervical Rotation 35* 30*   *indicates activity was associated with pain    Neuro:  Upper Extremity Myotomes Strength    Right Left   C4 - Shoulder Shrug 5/5 5/5   C5 - Shoulder ABD 4/5 5/5   C6 - Elbow Flexion 4/5 5/5   C6 - Wrist Extension 5/5 5/5   C7 - Elbow Extension 4/5 5/5   C7 - Wrist Flexion 4/5 5/5   C8 - Thumbs Extension 4/5 5/5   T1 - Finger ABD/ADD 4/5 5/5     Special Tests:   Neck Special Tests:   Mechanical:  Seated Cervical Compression Test: (+)  Seated  Cervical Distraction Test: (+)  Spurling's: (R) (+), (L) (+)  Upper Limb Tension Tests:   Median Nerve Tension: (R) (-), (L) (-)  Radial Nerve Tension: (R) (-), (L) (-)  Ulnar Nerve Tension: (R) (-), (L) (-)     ASSESSMENT:     NELA is a 54 year old female that presents to physical therapy evaluation with a chronic history of neck pain with a past surgical history of 4 neck surgeries with a C4-C7 fusion. The patient reports a chronic, low grade level of pain since her surgeries but recently aggravated her neck when taking our the garbage. 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 Current functional limitations include, but are not limited to, washing her hair and overhead work/lifting, driving long distances, checking blindspot, and sleeping at night. The patient would benefit from physical therapy to facilitate improved pain and symptom management and progression in postural mobility, strength and endurance for improved tolerance to ADL's and return to PLF.     Precautions/WB Status: WBAT  Education or Treatment Limitation(s): None  Rehab Potential: Fair    TREATMENT:     Initial Evaluation: x 20min     Therapeutic Activities: x 3min  Patient Education: Patient was educated on anatomy and pathophysiology of current condition, rationale for physical therapy, anticipated treatment interventions, prognosis, timeline for recovery, and expected functional outcomes based on evaluative findings.     Therapeutic Exercise: x 12min  Elastic band pull apart (supine): 2  x 10 (B), yellow theraband   Seated scap retraction: 2 x 10 x 3\"   Modified downward dog/low back stretch: 1 x 10 x 3\"  Administered HEP: Reviewed HEP handout, exercise selection, and recommended resistance. Provided verbal and written instructions/cueing for proper technique and common errors/compensations as needed.   Patient  Education: Patient was educated on the importance of compliance with consistent treatment and HEP to achieve mutually established goals. Patient verbalized understanding.     Manual Therapy: x 10min  Soft tissue mobilization (supine): upper trap, levator, cervical paraspinals  Cervical distraction: 3 x 30\"  Suboccipital release: x 3min     Home Exercise Program:   Access Code: [email or web address]  URL: [email or web address]  Date: 10/04/2024  Prepared by: Natalie Flores    Exercises  - Supine Shoulder Horizontal Abduction with Resistance  - 1 x daily - 7 x weekly - 2 sets - 10 reps  - Seated Scapular Retraction  - 1 x daily - 7 x weekly - 2 sets - 10 reps - 2-3\" hold  - Standing Lumbar Spine Flexion Stretch Counter  - 1 x daily - 7 x weekly - 1 sets - 10 reps - 2-3\" hold    Provider Interactions With Patient:   All patient's questions were answered and the patient denied further comments, complaints, or concerns upon departure.   Patient was issued an appt list and verbally confirmed the next appt date and time to ensure consistency with physical therapy attendance.     Charges: PT Eval Low Complexity Complexity x 1, MT x 1, Therex 1   Total Timed Treatment: 25min       Total Treatment Time: 45min     PLAN OF CARE:      Goals:  Short-Term Goals:  The patient will improve cervical flexion AROM to 50deg pain-free to facilitate looking up for completing household chores that require looking down such as cooking, washing dishes, etc.. Timeframe: 4-6 visits.  The patient will improve cervical rotation AROM to 35deg (B) pain-free to facilitate mobility for checking his/her blindspot for safety while driving a vehicle}. Timeframe: 6 visits.  The patient will improve cervical extension AROM to 50 deg pain-free to facilitate looking up for overhead work with household chores. Timeframe: 6-8 visits.  Long-Term Goals:  Patient will improve cervical neck pain and headache symptoms to facilitate sleeping at least at  least 6 hours without interruption due to neck pain for adequate rest. Timeframe: 10-12 visits.  Patient will improve postural strength and upper body strength and mobility to facilitate washing her hair in the shower. Timeframe: 12 visits.   Patient will improve thoracic spine mobility and postural endurance to maintain proper posture with neutral head position while sitting to facilitate 4 hours of pain-free desk work for occupational activities.. Timeframe: 12 visits.  Patient will improve Neck Disability Index (NDI) score by 10 points or 10% to indicate a true change in improved function for ADL's and restoring PLF. Timeframe: 12 visits.    Plan Frequency / Duration: Patient will be seen for 2x/week for 6 weeks, for a total of 12 visits, over a 90 day period. We will re-evaluate the patient at that time in order to determine functional progress, evaluate short-term goal completion, and establish an updated plan of care. Possible treatment interventions will/may include: Therapeutic Activities, Therapeutic Exercise, Neuromuscular Re-education, Manual Therapy, Home Exercise Program Instruction, Patient Education, Self-Care/Home Management, and Modalities as needed.    Patient/Family was advised of these findings, precautions, and treatment options and has agreed to actively participate in planning and for this course of care.    Thank you for your referral. Please co-sign or sign and return this letter via fax as soon as possible to [phone]. If you have any questions, please contact me at Dept: [phone].    Sincerely,    X___Natalie Flores PT, DPT, SCS, ATC, CSCS____ Date: _____10/4/2024_________    Electronically signed by therapist: Natalie Ignacio PT  Physician's certification required: Yes  I certify the need for these services furnished under this plan of treatment and while under my care.

## 2024-10-07 ENCOUNTER — OFFICE VISIT (OUTPATIENT)
Dept: PHYSICAL THERAPY | Age: 54
End: 2024-10-07
Attending: PHYSICIAN ASSISTANT
Payer: COMMERCIAL

## 2024-10-07 PROCEDURE — 97110 THERAPEUTIC EXERCISES: CPT

## 2024-10-07 PROCEDURE — 97140 MANUAL THERAPY 1/> REGIONS: CPT

## 2024-10-07 NOTE — PROGRESS NOTES
Date of service: 10/07/2024  Dx: Chronic neck pain (M54.2,G89.29), History of fusion of cervical spine (Z98.1)       Insurance: [employer] [place] [employer]  Insurance Limits: 75 visit limit  Visit #: 2  Authorized # of Visits: 12 recommended  POC/Auth Expiration: N/A  Authorizing Physician/Provider: [employer] Insurance Primary/Secondary: [employer] INS / N/A               Subjective: Patient reports pain in her neck often causes headaches    Pain: 5-6/10      Objective: tingling in left hand during supine chin tucks       Assessment: Patient care this day emphasized tissue mobility of cervical spine, and beginning postural strengthening. Patient did experience some tingling/numbness sensations into her left hand and fingers during chin tucks, and says this has been a recurring issue that has since reduced but has continued. Patient would benefit from continued efforts to restore neutral posture, reduce rounded shoulders, and strengthen thoracic spine.    Goals:   Short-Term Goals:  The patient will improve cervical flexion AROM to 50deg pain-free to facilitate looking up for completing household chores that require looking down such as cooking, washing dishes, etc.. Timeframe: 4-6 visits.  The patient will improve cervical rotation AROM to 35deg (B) pain-free to facilitate mobility for checking his/her blindspot for safety while driving a vehicle}. Timeframe: 6 visits.  The patient will improve cervical extension AROM to 50 deg pain-free to facilitate looking up for overhead work with household chores. Timeframe: 6-8 visits.  Long-Term Goals:  Patient will improve cervical neck pain and headache symptoms to facilitate sleeping at least at least 6 hours without interruption due to neck pain for adequate rest. Timeframe: 10-12 visits.  Patient will improve postural strength and upper body strength and mobility to facilitate washing her hair in the shower. Timeframe: 12 visits.   Patient will improve thoracic spine mobility and  postural endurance to maintain proper posture with neutral head position while sitting to facilitate 4 hours of pain-free desk work for occupational activities.. Timeframe: 12 visits.  Patient will improve Neck Disability Index (NDI) score by 10 points or 10% to indicate a true change in improved function for ADL's and restoring PLF. Timeframe: 12 visits.    Plan: Progress postural strengthening per tolerance  Date: 10/07/2024  TX#: 2/12 Date:                 TX#: 3/ Date:                 TX#: 4/ Date:                 TX#: 5/ Date:   Tx#: 6/   Ther Ex: 15'   Supine Chin Tucks x 10 5\" H   Seated Scapular Retraction 2 x 10 3-5\" H  90 deg Doorway Pec Stretch 2 x 20\" ea R/L  120 deg Doorway Pec Stretch 2 x 20\" ea R/L  Supine HA RTB x 10       Manual: 23'   Cervical Distraction   STM: Anterior Neck, Scalenes, Cervical Paraspinals                      HEP: Access Code: [email or web address]  URL: [email or web address]  Date: 10/04/2024  Prepared by: Natalie Flores     Exercises  - Supine Shoulder Horizontal Abduction with Resistance  - 1 x daily - 7 x weekly - 2 sets - 10 reps  - Seated Scapular Retraction  - 1 x daily - 7 x weekly - 2 sets - 10 reps - 2-3\" hold  - Standing Lumbar Spine Flexion Stretch Counter  - 1 x daily - 7 x weekly - 1 sets - 10 reps - 2-3\" hold    Charges: Manual x 2, Ther Ex x 1         Total Timed Treatment: 38 min    Total Treatment Time: 38 min

## 2024-10-08 ENCOUNTER — E-ADVICE (OUTPATIENT)
Dept: INTERNAL MEDICINE | Age: 54
End: 2024-10-08

## 2024-10-09 ENCOUNTER — TELEPHONE (OUTPATIENT)
Dept: PHYSICAL THERAPY | Age: 54
End: 2024-10-09

## 2024-10-10 ENCOUNTER — OFFICE VISIT (OUTPATIENT)
Dept: PHYSICAL THERAPY | Age: 54
End: 2024-10-10
Attending: PHYSICIAN ASSISTANT
Payer: COMMERCIAL

## 2024-10-10 PROCEDURE — 97110 THERAPEUTIC EXERCISES: CPT

## 2024-10-10 PROCEDURE — 97140 MANUAL THERAPY 1/> REGIONS: CPT

## 2024-10-10 NOTE — PROGRESS NOTES
Date of service: 10/10/2024  Dx: Chronic neck pain (M54.2,G89.29), History of fusion of cervical spine (Z98.1)       Insurance: [employer] [place] [employer]  Insurance Limits: 75 visit limit  Visit #: 3  Authorized # of Visits: 12 recommended  POC/Auth Expiration: N/A  Authorizing Physician/Provider: [employer] Insurance Primary/Secondary: [employer] INS / N/A               Subjective: Patient reports she woke up and was not able to go back to sleep due to neck pain. Patient reports pain was about the same after previous visit with little change.   Pain: 6-7/10 at worst since previous visit; 4/10 end of tx     Objective: tingling in left hand during supine chin tucks -- still present 10/10    Assessment: Patient returned to PT this day with limited improvement reported since previous visit, even immediately following. Modified treatment this day to focus on more thoracic mobility in addition to cervical tissue mobility continued. Patient responded more significantly to cupping of thoracic region that was then followed by anterior shoulder stretching and postural strengthening. Patient also reported increased range with \"open books\" from first repetition to last. Patient would benefit from continued efforts to reduce thoracic kyphosis posture and improve general strengthening of periscapular muscles.     Goals:   Short-Term Goals:  The patient will improve cervical flexion AROM to 50deg pain-free to facilitate looking up for completing household chores that require looking down such as cooking, washing dishes, etc.. Timeframe: 4-6 visits.  The patient will improve cervical rotation AROM to 35deg (B) pain-free to facilitate mobility for checking his/her blindspot for safety while driving a vehicle}. Timeframe: 6 visits.  The patient will improve cervical extension AROM to 50 deg pain-free to facilitate looking up for overhead work with household chores. Timeframe: 6-8 visits.  Long-Term Goals:  Patient will improve cervical neck  pain and headache symptoms to facilitate sleeping at least at least 6 hours without interruption due to neck pain for adequate rest. Timeframe: 10-12 visits.  Patient will improve postural strength and upper body strength and mobility to facilitate washing her hair in the shower. Timeframe: 12 visits.   Patient will improve thoracic spine mobility and postural endurance to maintain proper posture with neutral head position while sitting to facilitate 4 hours of pain-free desk work for occupational activities.. Timeframe: 12 visits.  Patient will improve Neck Disability Index (NDI) score by 10 points or 10% to indicate a true change in improved function for ADL's and restoring PLF. Timeframe: 12 visits.    Plan: Progress postural strengthening per tolerance  Date: 10/07/2024  TX#: 2/12 Date: 10/10/2024            TX#: 3/12 Date:                 TX#: 4/ Date:                 TX#: 5/ Date:   Tx#: 6/   Ther Ex: 15'   Supine Chin Tucks x 10 5\" H   Seated Scapular Retraction 2 x 10 3-5\" H  90 deg Doorway Pec Stretch 2 x 20\" ea R/L  120 deg Doorway Pec Stretch 2 x 20\" ea R/L  Supine HA RTB x 10 Ther Ex: 25'   Seated Scapular Retraction 2 x 10 3-5\" H  Open Books x 10 5\" H ea R/L   Supine Chin Tucks x 20 5\" H   90 deg Doorway Pec Stretch 2 x 20\" ea R/L  120 deg Doorway Pec Stretch 2 x 20\" ea R/L      Manual: 23'   Cervical Distraction   STM: Anterior Neck, Scalenes, Cervical Paraspinals  Manual: 15'   Cervical Distraction   STM: Anterior Neck, Scalenes, Cervical Paraspinals   Cupping: Thoracic Paraspinals, Periscapular Muscles (B) 6'                    HEP: Access Code: [email or web address]  URL: [email or web address]  Date: 10/04/2024  Prepared by: Natalie Flores     Exercises  - Supine Shoulder Horizontal Abduction with Resistance  - 1 x daily - 7 x weekly - 2 sets - 10 reps  - Seated Scapular Retraction  - 1 x daily - 7 x weekly - 2 sets - 10 reps - 2-3\" hold  - Standing Lumbar Spine Flexion Stretch Counter  - 1 x  daily - 7 x weekly - 1 sets - 10 reps - 2-3\" hold    Charges: Manual x 1, Ther Ex x 2         Total Timed Treatment: 40 min    Total Treatment Time: 40 min

## 2024-10-16 ENCOUNTER — TELEPHONE (OUTPATIENT)
Dept: PHYSICAL THERAPY | Facility: HOSPITAL | Age: 54
End: 2024-10-16

## 2024-10-16 ENCOUNTER — APPOINTMENT (OUTPATIENT)
Dept: PHYSICAL THERAPY | Age: 54
End: 2024-10-16
Attending: PHYSICIAN ASSISTANT
Payer: COMMERCIAL

## 2024-10-16 ENCOUNTER — OFFICE VISIT (OUTPATIENT)
Dept: PHYSICAL THERAPY | Age: 54
End: 2024-10-16
Attending: PHYSICIAN ASSISTANT
Payer: COMMERCIAL

## 2024-10-16 PROCEDURE — 97140 MANUAL THERAPY 1/> REGIONS: CPT

## 2024-10-16 PROCEDURE — 97110 THERAPEUTIC EXERCISES: CPT

## 2024-10-16 NOTE — PROGRESS NOTES
Date of service: 10/16/2024  Dx: Chronic neck pain (M54.2,G89.29), History of fusion of cervical spine (Z98.1)       Insurance: [employer] [place] [employer]  Insurance Limits: 75 visit limit  Visit #: 4  Authorized # of Visits: 12 recommended  POC/Auth Expiration: N/A  Authorizing Physician/Provider: [employer] Insurance Primary/Secondary: [employer] / N/A               Subjective: Patient reports pain during the day has been less but still limited with sleep quality/tolerance. Patient states since previous visit she has been better able to turn her head to the left but still has difficulty with looking down. Patient reports she has been able to lift her arms over her head with less restriction  Pain: 5-6/10 at worst since previous visit; 2/10 beginning session     Objective: scapular stability better on left vs right, but tingling presents on left side    Assessment: Patient returned to PT with significant improvements noted in cervical symptoms at rest, better able to turn her head to the left and is now without tingling in left arm. Patient however still reporting difficulty with sleeping/staying asleep and would benefit from continued PT to address. Patient care this day focused on continued efforts to improve tissue mobility of cervical and thoracic spine with cupping and manual interventions, as well as scapular strengthening. Patient provided with updated HEP this day to reinforce postural corrections and would benefit from follow up on response next visit to compliance and symptom management.    Goals:   Short-Term Goals:  The patient will improve cervical flexion AROM to 50deg pain-free to facilitate looking up for completing household chores that require looking down such as cooking, washing dishes, etc.. Timeframe: 4-6 visits.  The patient will improve cervical rotation AROM to 35deg (B) pain-free to facilitate mobility for checking his/her blindspot for safety while driving a vehicle}. Timeframe: 6 visits.  The  patient will improve cervical extension AROM to 50 deg pain-free to facilitate looking up for overhead work with household chores. Timeframe: 6-8 visits.  Long-Term Goals:  Patient will improve cervical neck pain and headache symptoms to facilitate sleeping at least at least 6 hours without interruption due to neck pain for adequate rest. Timeframe: 10-12 visits.  Patient will improve postural strength and upper body strength and mobility to facilitate washing her hair in the shower. Timeframe: 12 visits.   Patient will improve thoracic spine mobility and postural endurance to maintain proper posture with neutral head position while sitting to facilitate 4 hours of pain-free desk work for occupational activities.. Timeframe: 12 visits.  Patient will improve Neck Disability Index (NDI) score by 10 points or 10% to indicate a true change in improved function for ADL's and restoring PLF. Timeframe: 12 visits.    Plan: Progress postural strengthening per tolerance  Date: 10/07/2024  TX#: 2/12 Date: 10/10/2024            TX#: 3/12 Date: 10/16/2024          TX#: 4/12 Date:                 TX#: 5/ Date:   Tx#: 6/   Ther Ex: 15'   Supine Chin Tucks x 10 5\" H   Seated Scapular Retraction 2 x 10 3-5\" H  90 deg Doorway Pec Stretch 2 x 20\" ea R/L  120 deg Doorway Pec Stretch 2 x 20\" ea R/L  Supine HA RTB x 10   Ther Ex: 25'   Seated Scapular Retraction 2 x 10 3-5\" H  Open Books x 10 5\" H ea R/L   Supine Chin Tucks x 20 5\" H   90 deg Doorway Pec Stretch 2 x 20\" ea R/L  120 deg Doorway Pec Stretch 2 x 20\" ea R/L Ther Ex: 25'   Supine Chin Tucks x 20 5\" H   Seated Scapular Retraction x 20 5\" H  Levator Stretch 2 x 20\" ea R/L (L Rot caused tingling in L hand)  90 deg Doorway Pec Stretch 2 x 20\" ea R/L  120 deg Doorway Pec Stretch 2 x 20\" ea R/L  Standing HA RTB x 10\     Manual: 23'   Cervical Distraction   STM: Anterior Neck, Scalenes, Cervical Paraspinals  Manual: 15'   Cervical Distraction   STM: Anterior Neck, Scalenes,  Cervical Paraspinals   Cupping: Thoracic Paraspinals, Periscapular Muscles (B) 6' Manual: 15'   Cervical Distraction   STM: Anterior Neck, Scalenes, Cervical Paraspinals   Cupping: Thoracic Paraspinals, Periscapular Muscles (B) 6'                   HEP:  Exercises  - Supine Shoulder Horizontal Abduction with Resistance  - 1 x daily - 7 x weekly - 2 sets - 10 reps  - Seated Scapular Retraction  - 3 x daily - 7 x weekly - 1 sets - 20 reps - 2-3\" hold  - Standing Lumbar Spine Flexion Stretch Counter  - 1 x daily - 7 x weekly - 1 sets - 10 reps - 10s hold  - Single Arm Doorway Pec Stretch at 90 Degrees Abduction  - 3 x daily - 7 x weekly - 3 sets - 30s hold  - Single Arm Doorway Pec Stretch at 120 Degrees Abduction  - 3 x daily - 7 x weekly - 3 sets - 30s hold  - Shoulder extension with resistance - Neutral  - 1 x daily - 7 x weekly - 1 sets - 10 reps    Charges: Manual x 1, Ther Ex x 2         Total Timed Treatment: 40 min    Total Treatment Time: 40 min

## 2024-10-18 ENCOUNTER — OFFICE VISIT (OUTPATIENT)
Dept: PHYSICAL THERAPY | Age: 54
End: 2024-10-18
Attending: PHYSICIAN ASSISTANT
Payer: COMMERCIAL

## 2024-10-18 PROCEDURE — 97110 THERAPEUTIC EXERCISES: CPT

## 2024-10-18 PROCEDURE — 97140 MANUAL THERAPY 1/> REGIONS: CPT

## 2024-10-18 NOTE — PATIENT INSTRUCTIONS
Thank you for coming to your physical therapy appt! During your appt today you were issued a home exercise program with a printed handout from [employer]. Your home exercise program can also be accessed using the information below. The selected exercises are meant to supplement the treatment you received and reinforce your progress made in physical therapy. Please complete these exercises as instructed by your physical therapist. It is important to stay consistent with your exercises to maximize your recovery!       Access Code: [email or web address]  URL: [email or web address]  Date: 10/18/2024  Prepared by: Natalie Flores    Exercises  - Sidelying Thoracic Rotation with Open Book  - 1 x daily - 7 x weekly - 1 sets - 10 reps - 3\" hold  - Standing Lumbar Spine Flexion Stretch Counter  - 1 x daily - 7 x weekly - 1 sets - 10 reps - 3\" hold  - Standing Shoulder Horizontal Abduction with Resistance  - 1 x daily - 7 x weekly - 2 sets - 10 reps  - Standing Shoulder Row with Anchored Resistance  - 1 x daily - 7 x weekly - 2 sets - 10 reps  - Shoulder extension with resistance - Neutral  - 1 x daily - 7 x weekly - 2 sets - 10 reps  - Doorway Pec Stretch at 90 Degrees Abduction  - 1 x daily - 7 x weekly - 1 sets - 2 reps - 30\"  hold  - Doorway Pec Stretch at 120 Elevation with Arm Straight  - 1 x daily - 7 x weekly - 1 sets - 2 reps - 30\" hold

## 2024-10-21 ENCOUNTER — OFFICE VISIT (OUTPATIENT)
Dept: PHYSICAL THERAPY | Age: 54
End: 2024-10-21
Attending: PHYSICIAN ASSISTANT
Payer: COMMERCIAL

## 2024-10-21 PROCEDURE — 97110 THERAPEUTIC EXERCISES: CPT

## 2024-10-21 PROCEDURE — 97140 MANUAL THERAPY 1/> REGIONS: CPT

## 2024-10-21 NOTE — PROGRESS NOTES
Date of service: 10/21/2024  Dx: Chronic neck pain (M54.2,G89.29), History of fusion of cervical spine (Z98.1)       Insurance: [employer] [place] [employer]  Insurance Limits: 75 visit limit  Visit #: 5  Authorized # of Visits: 12 recommended  POC/Auth Expiration: N/A  Authorizing Physician/Provider: Reneer Insurance Primary/Secondary: [employer] INS / N/A               Subjective: Patient reports periods of relief are increasing lately, still with tingling in left arm though. Patient reports worst symptoms are the base of her skull pain on left side, but has been able to better turn her head to the left session to session.   Patient reports she gets headaches often in the middle of the night, and at times keeps her from going to sleep she will have headaches as well.     Objective:     Pain/Symptom Presentation:   Pain at rest: 3-4/10, 1-2/10  Pain at worst: 5/10     Assessment: Patient returned to PT this day reporting relief of symptoms since previous visit up until last night with no known cause. Patient care this day continued to focus on mobility of anterior shoulder and scapular stabilization, still with some difficulty to maintain scapular depression but improving. Patient would benefit from continued efforts to restore neutral alignment and improve posture to reduce radicular symptoms of left side.    Goals:   Short-Term Goals:  The patient will improve cervical flexion AROM to 50deg pain-free to facilitate looking up for completing household chores that require looking down such as cooking, washing dishes, etc.. Timeframe: 4-6 visits.  The patient will improve cervical rotation AROM to 35deg (B) pain-free to facilitate mobility for checking his/her blindspot for safety while driving a vehicle}. Timeframe: 6 visits.  The patient will improve cervical extension AROM to 50 deg pain-free to facilitate looking up for overhead work with household chores. Timeframe: 6-8 visits.  Long-Term Goals:  Patient will improve  cervical neck pain and headache symptoms to facilitate sleeping at least at least 6 hours without interruption due to neck pain for adequate rest. Timeframe: 10-12 visits.  Patient will improve postural strength and upper body strength and mobility to facilitate washing her hair in the shower. Timeframe: 12 visits.   Patient will improve thoracic spine mobility and postural endurance to maintain proper posture with neutral head position while sitting to facilitate 4 hours of pain-free desk work for occupational activities.. Timeframe: 12 visits.  Patient will improve Neck Disability Index (NDI) score by 10 points or 10% to indicate a true change in improved function for ADL's and restoring PLF. Timeframe: 12 visits.    Plan: Follow up on tolerance to updated HEP.   Date: 10/16/2024          TX#: 4/12 Date: 10/18/24            TX#: 5/12 Date: 10/21/2024   Tx#: 6/12   Ther Ex: 25'   Supine Chin Tucks x 20 5\" H   Seated Scapular Retraction x 20 5\" H  Levator Stretch 2 x 20\" ea R/L (L Rot caused tingling in L hand)  90 deg Doorway Pec Stretch 2 x 20\" ea R/L  120 deg Doorway Pec Stretch 2 x 20\" ea R/L  Standing HA RTB x 10 Therapeutic Exercise: x 25min   Manual upper trap stretch: x 30\" (B)  S/L thoracic rotation (open book): 1 x 10  x 3\"  Seated Scapular Retraction: 2 x 10 x 3\", arms crossed   Elastic band pull apart: 2 x 10 (B), red theraband   Elastic band low rows: 2 x 10 (B), red theraband   Elastic band (B) ext: 2 x 10 (B), red theraband  90 deg Doorway Pec Stretch: x 30\" (B)   120 deg Doorway Pec Stretch: x 30\" (B)  Therapeutic Exercise: x 25 min    S/L thoracic rotation (open book): 1 x 10  x 5\"  Manual upper trap stretch: 3 x 30\" (B)  Seated Scapular Retraction: x 20 x 5\", arms crossed   Elastic band pull apart: 2 x 10 (B), red theraband   Elastic band low rows: 2 x 10 (B), blue TB  Elastic band (B) ext: 2 x 10 (B), green  90 deg Doorway Pec Stretch: 2 x 30\" (B)   120 deg Doorway Pec Stretch: 2 x 30\" (B)     Manual: 15'   Cervical Distraction   STM: Anterior Neck, Scalenes, Cervical Paraspinals   Cupping: Thoracic Paraspinals, Periscapular Muscles (B) 6' Manual Therapy: x 20min   Soft tissue mobilization (supine): upper trap, levator, cervical paraspinals   Suboccipital release: x 3min   Cervical distraction: 3 x 30\"   Cervical upglide joint mobs - C3-C8: Grade 3, 2 x 10\" each (B)  Manual: 15'  Soft tissue mobilization (supine): upper trap, levator, cervical paraspinals   Suboccipital release: x 3min   Cupping (small): Base of skull (L) Levator, Scalenes             HEP:  Access Code: [email or web address]  URL: [email or web address]  Date: 10/18/2024  Prepared by: Natalie Flores    Exercises  - Sidelying Thoracic Rotation with Open Book  - 1 x daily - 7 x weekly - 1 sets - 10 reps - 3\" hold  - Standing Lumbar Spine Flexion Stretch Counter  - 1 x daily - 7 x weekly - 1 sets - 10 reps - 3\" hold  - Standing Shoulder Horizontal Abduction with Resistance  - 1 x daily - 7 x weekly - 2 sets - 10 reps  - Standing Shoulder Row with Anchored Resistance  - 1 x daily - 7 x weekly - 2 sets - 10 reps  - Shoulder extension with resistance - Neutral  - 1 x daily - 7 x weekly - 2 sets - 10 reps  - Doorway Pec Stretch at 90 Degrees Abduction  - 1 x daily - 7 x weekly - 1 sets - 2 reps - 30\"  hold  - Doorway Pec Stretch at 120 Elevation with Arm Straight  - 1 x daily - 7 x weekly - 1 sets - 2 reps - 30\" hold    Charges: MT x 1, Therex x 2         Total Timed Treatment: 40 min    Total Treatment Time: 40 min

## 2024-10-23 ENCOUNTER — OFFICE VISIT (OUTPATIENT)
Dept: PHYSICAL THERAPY | Age: 54
End: 2024-10-23
Attending: PHYSICIAN ASSISTANT
Payer: COMMERCIAL

## 2024-10-23 PROCEDURE — 97110 THERAPEUTIC EXERCISES: CPT

## 2024-10-23 PROCEDURE — 97140 MANUAL THERAPY 1/> REGIONS: CPT

## 2024-10-23 NOTE — PROGRESS NOTES
Date of service: 10/23/2024  Dx: Chronic neck pain (M54.2,G89.29), History of fusion of cervical spine (Z98.1)       Insurance: [employer] [place] [employer]  Insurance Limits: 75 visit limit  Visit #: 6  Authorized # of Visits: 12 recommended  POC/Auth Expiration: N/A  Authorizing Physician/Provider: Naveed   Insurance Primary/Secondary: [employer] INS / N/A               Subjective: Patient reports relief up until this morning after last visit, notes more stiffness/symptoms in general on right compared to left last visit now    Objective:     Pain/Symptom Presentation:   Pain at rest: 1-2/10, 1/10  Pain at worst: NT/10     Assessment: Patient continues to report recurring base of skull discomfort, but less severe than before on left side and now more on right side. Addressed this with small size cups bilaterally in similar location (levator, scalenes) and continued cervical distraction/SOR following. Patient was then cued through exercises focused on lower trap activation and strengthening, in combination with cervical stretching with stabilization of 1st rib. Patient would benefit from follow up next visit on period of relief/duration compared to previous visits.     Goals:   Short-Term Goals:  The patient will improve cervical flexion AROM to 50deg pain-free to facilitate looking up for completing household chores that require looking down such as cooking, washing dishes, etc.. Timeframe: 4-6 visits.  The patient will improve cervical rotation AROM to 35deg (B) pain-free to facilitate mobility for checking his/her blindspot for safety while driving a vehicle}. Timeframe: 6 visits.  The patient will improve cervical extension AROM to 50 deg pain-free to facilitate looking up for overhead work with household chores. Timeframe: 6-8 visits.  Long-Term Goals:  Patient will improve cervical neck pain and headache symptoms to facilitate sleeping at least at least 6 hours without interruption due to neck pain for adequate rest.  Timeframe: 10-12 visits.  Patient will improve postural strength and upper body strength and mobility to facilitate washing her hair in the shower. Timeframe: 12 visits.   Patient will improve thoracic spine mobility and postural endurance to maintain proper posture with neutral head position while sitting to facilitate 4 hours of pain-free desk work for occupational activities.. Timeframe: 12 visits.  Patient will improve Neck Disability Index (NDI) score by 10 points or 10% to indicate a true change in improved function for ADL's and restoring PLF. Timeframe: 12 visits.    Plan: Follow up on tolerance to updated HEP.   Date: 10/16/2024          TX#: 4/12 Date: 10/18/24            TX#: 5/12 Date: 10/21/2024   Tx#: 6/12 Date: 10/23/2024   Tx#: 7/12   Ther Ex: 25'   Supine Chin Tucks x 20 5\" H   Seated Scapular Retraction x 20 5\" H  Levator Stretch 2 x 20\" ea R/L (L Rot caused tingling in L hand)  90 deg Doorway Pec Stretch 2 x 20\" ea R/L  120 deg Doorway Pec Stretch 2 x 20\" ea R/L  Standing HA RTB x 10 Therapeutic Exercise: x 25min   Manual upper trap stretch: x 30\" (B)  S/L thoracic rotation (open book): 1 x 10  x 3\"  Seated Scapular Retraction: 2 x 10 x 3\", arms crossed   Elastic band pull apart: 2 x 10 (B), red theraband   Elastic band low rows: 2 x 10 (B), red theraband   Elastic band (B) ext: 2 x 10 (B), red theraband  90 deg Doorway Pec Stretch: x 30\" (B)   120 deg Doorway Pec Stretch: x 30\" (B)  Therapeutic Exercise: x 25 min    S/L thoracic rotation (open book): 1 x 10  x 5\"  Manual upper trap stretch: 3 x 30\" (B)  Seated Scapular Retraction: x 20 x 5\", arms crossed   Elastic band pull apart: 2 x 10 (B), red theraband   Elastic band low rows: 2 x 10 (B), blue TB  Elastic band (B) ext: 2 x 10 (B), green  90 deg Doorway Pec Stretch: 2 x 30\" (B)   120 deg Doorway Pec Stretch: 2 x 30\" (B)  Therapeutic Exercise: 25'  Added Supine Shoulder Ext (R) Stab @ 90 Deg Flexion (L) x 10 RTB  Added Supine Shoulder Ext (L)  Stab @ 90 Deg Flexion (R ) x 10 RTB  90 deg Doorway Pec Stretch: 2 x 30\" (B)   120 deg Doorway Pec Stretch: 2 x 30\" (B)   Seated Levator Stretch 3 x 20\" (L)  [id number] Rib Mob/Stab with Strap and UT Stretch 10 x 10\" H ea R/L        Manual: 15'   Cervical Distraction   STM: Anterior Neck, Scalenes, Cervical Paraspinals   Cupping: Thoracic Paraspinals, Periscapular Muscles (B) 6' Manual Therapy: x 20min   Soft tissue mobilization (supine): upper trap, levator, cervical paraspinals   Suboccipital release: x 3min   Cervical distraction: 3 x 30\"   Cervical upglide joint mobs - C3-C8: Grade 3, 2 x 10\" each (B)  Manual: 15'  Soft tissue mobilization (supine): upper trap, levator, cervical paraspinals   Suboccipital release: x 3min   Cupping (small): Base of skull (L) Levator, Scalenes 15'  Cupping:   Thoracic Paraspinals, Periscapular Muscles (B) 6'  Suboccipital release: x 3min   Cervical distraction: 3 x 30\"   Cupping: (small): Base of skull (L) Levator, Scalenes               HEP:  Access Code: [email or web address]  URL: [email or web address]  Date: 10/18/2024  Prepared by: Natalie Flores    Exercises  - Sidelying Thoracic Rotation with Open Book  - 1 x daily - 7 x weekly - 1 sets - 10 reps - 3\" hold  - Standing Lumbar Spine Flexion Stretch Counter  - 1 x daily - 7 x weekly - 1 sets - 10 reps - 3\" hold  - Standing Shoulder Horizontal Abduction with Resistance  - 1 x daily - 7 x weekly - 2 sets - 10 reps  - Standing Shoulder Row with Anchored Resistance  - 1 x daily - 7 x weekly - 2 sets - 10 reps  - Shoulder extension with resistance - Neutral  - 1 x daily - 7 x weekly - 2 sets - 10 reps  - Doorway Pec Stretch at 90 Degrees Abduction  - 1 x daily - 7 x weekly - 1 sets - 2 reps - 30\"  hold  - Doorway Pec Stretch at 120 Elevation with Arm Straight  - 1 x daily - 7 x weekly - 1 sets - 2 reps - 30\" hold    Charges: MT x 1, Therex x 2         Total Timed Treatment: 40 min    Total Treatment Time: 40 min

## 2024-10-28 ENCOUNTER — APPOINTMENT (OUTPATIENT)
Dept: PHYSICAL THERAPY | Age: 54
End: 2024-10-28
Attending: PHYSICIAN ASSISTANT
Payer: COMMERCIAL

## 2024-10-31 ENCOUNTER — OFFICE VISIT (OUTPATIENT)
Dept: PHYSICAL THERAPY | Age: 54
End: 2024-10-31
Attending: PHYSICIAN ASSISTANT
Payer: COMMERCIAL

## 2024-10-31 PROCEDURE — 97110 THERAPEUTIC EXERCISES: CPT

## 2024-10-31 PROCEDURE — 97140 MANUAL THERAPY 1/> REGIONS: CPT

## 2024-10-31 NOTE — PROGRESS NOTES
Date of Service: 11/1/2024  Dx: Chronic neck pain (M54.2,G89.29), History of fusion of cervical spine (Z98.1)                Insurance: [employer] [place] [employer]  Insurance Limits: 75 visit limit  Visit #: 7  Authorized # of Visits: 12  POC/Auth Expiration: N/A  Date of Last PN: 10/4/24 (Visit #1)  Authorizing Physician/Provider: Naveed Coto MD visit: N/A  Fall Risk: Standard         Precautions: None            Subjective: The patient reports that she is still having pain. She reports that her pain isn't the worst it's every been, but it's been better. She reports that she hasn't been to PT since last Wednesday and that may have caused some increased pain.     Objective:     Pain/Symptom Presentation:   Pain at rest pre-tx: 5.5/10  Pain at worst: 7/10   Pain at rest post-tx: 2/10    HEP:   Access Code: [email or web address]  URL: [email or web address]  Date: 10/18/2024  Prepared by: Natalie Flores    Exercises  - Sidelying Thoracic Rotation with Open Book  - 1 x daily - 7 x weekly - 1 sets - 10 reps - 3\" hold  - Standing Lumbar Spine Flexion Stretch Counter  - 1 x daily - 7 x weekly - 1 sets - 10 reps - 3\" hold  - Standing Shoulder Horizontal Abduction with Resistance  - 1 x daily - 7 x weekly - 2 sets - 10 reps  - Standing Shoulder Row with Anchored Resistance  - 1 x daily - 7 x weekly - 2 sets - 10 reps  - Shoulder extension with resistance - Neutral  - 1 x daily - 7 x weekly - 2 sets - 10 reps  - Doorway Pec Stretch at 90 Degrees Abduction  - 1 x daily - 7 x weekly - 1 sets - 2 reps - 30\"  hold  - Doorway Pec Stretch at 120 Elevation with Arm Straight  - 1 x daily - 7 x weekly - 1 sets - 2 reps - 30\" hold    Treatment:    Therapeutic Exercise: x 20min  Prone scap retraction: 2 x 10 x 3\"   Supine Shoulder Ext (R) Stab @ 90 Deg Flexion (L) x 10 RTB  Supine Shoulder Ext (L) Stab @ 90 Deg Flexion (R) x 10 RTB  Upper trap stretch: x 30\" (B)  Levator stretch: x 30\" (B)   90 deg Doorway Pec Stretch: 2 x 30\" (B)    120 deg Doorway Pec Stretch: 2 x 30\" (B)   Modified downward dog: 1 x 10 x 3\"     Manual Therapy: x 25min  Soft tissue mobilization (supine): upper trap, levator, cervical paraspinals   Suboccipital release: x 3min   Cervical distraction: 3 x 30\"   Cervical upglide joint mobs - C3-C8: Grade 3, 2 x 10\" each (B)   Cupping to thoracic spinals x 5min    Provider Interactions With Patient:   Added therapeutic exercises as documented, with cueing provided throughout performance to ensure correct technique during exercise.  Verbally confirmed the patient's next appt date and time to ensure consistency with physical therapy attendance.     Assessment: The patient continues to respond favorably to PT sessions with improved pain management post-tx, but does note that her pain relief is still temporary at this time. She notes increased pain possibly due to more time between sessions since her last appt. The patient continues to work on postural mobility and strength with her exercises and HEP. The patient would benefit from continued PT for continued work on pain and symptom management, monitoring for her between-sessions improvement duration to improve.     Goals:   Short-Term Goals:  The patient will improve cervical flexion AROM to 50deg pain-free to facilitate looking up for completing household chores that require looking down such as cooking, washing dishes, etc.. Timeframe: 4-6 visits.  The patient will improve cervical rotation AROM to 35deg (B) pain-free to facilitate mobility for checking his/her blindspot for safety while driving a vehicle}. Timeframe: 6 visits.  The patient will improve cervical extension AROM to 50 deg pain-free to facilitate looking up for overhead work with household chores. Timeframe: 6-8 visits.  Long-Term Goals:  Patient will improve cervical neck pain and headache symptoms to facilitate sleeping at least at least 6 hours without interruption due to neck pain for adequate rest. Timeframe:  10-12 visits.  Patient will improve postural strength and upper body strength and mobility to facilitate washing her hair in the shower. Timeframe: 12 visits.   Patient will improve thoracic spine mobility and postural endurance to maintain proper posture with neutral head position while sitting to facilitate 4 hours of pain-free desk work for occupational activities.. Timeframe: 12 visits.  Patient will improve Neck Disability Index (NDI) score by 10 points or 10% to indicate a true change in improved function for ADL's and restoring PLF. Timeframe: 12 visits.    Plan: Continue to work on pain and symptom management and progress postural mobility and strength.       Charges: MT x 2, Therex x 1          Total Timed Treatment: 40min    Total Treatment Time: 40min

## 2024-11-06 ENCOUNTER — TELEPHONE (OUTPATIENT)
Dept: PHYSICAL THERAPY | Facility: HOSPITAL | Age: 54
End: 2024-11-06

## 2024-11-06 ENCOUNTER — APPOINTMENT (OUTPATIENT)
Dept: PHYSICAL THERAPY | Age: 54
End: 2024-11-06
Attending: PHYSICIAN ASSISTANT
Payer: COMMERCIAL

## 2024-11-08 ENCOUNTER — OFFICE VISIT (OUTPATIENT)
Dept: PHYSICAL THERAPY | Age: 54
End: 2024-11-08
Attending: PHYSICIAN ASSISTANT
Payer: COMMERCIAL

## 2024-11-08 PROCEDURE — 97110 THERAPEUTIC EXERCISES: CPT

## 2024-11-08 PROCEDURE — 97140 MANUAL THERAPY 1/> REGIONS: CPT

## 2024-11-08 NOTE — PROGRESS NOTES
Date of Service: 11/08/2024  Dx: Chronic neck pain (M54.2,G89.29), History of fusion of cervical spine (Z98.1)                Insurance: [employer] [place] [employer]  Insurance Limits: 75 visit limit  Visit #: 8  Authorized # of Visits: 12  POC/Auth Expiration: N/A  Date of Last PN: 10/4/24 (Visit #1)  Authorizing Physician/Provider: Naveed Coto MD visit: N/A  Fall Risk: Standard         Precautions: None            Subjective: Patient reports sharp pain has continued, has less frequent and less severe radiating symptoms into LUE though. Patient reports radicular pain is very infrequent now, but does still experience down into hand and fingers. Patient reports overall neck pain has improved since first visit, but not much in recent weeks.     Objective: Patient demonstrates ability to retract and depress bilateral scapulas, but unable to maintain for any degree of elevation.     Pain/Symptom Presentation:   Pain at rest pre-tx: 5/10  Pain at worst: 7/10   Pain at rest post-tx: 2/10    HEP:   Access Code: [email or web address]  URL: [email or web address]  Date: 10/18/2024  Prepared by: Natalie Flores    Exercises  - Sidelying Thoracic Rotation with Open Book  - 1 x daily - 7 x weekly - 1 sets - 10 reps - 3\" hold  - Standing Lumbar Spine Flexion Stretch Counter  - 1 x daily - 7 x weekly - 1 sets - 10 reps - 3\" hold  - Standing Shoulder Horizontal Abduction with Resistance  - 1 x daily - 7 x weekly - 2 sets - 10 reps  - Standing Shoulder Row with Anchored Resistance  - 1 x daily - 7 x weekly - 2 sets - 10 reps  - Shoulder extension with resistance - Neutral  - 1 x daily - 7 x weekly - 2 sets - 10 reps  - Doorway Pec Stretch at 90 Degrees Abduction  - 1 x daily - 7 x weekly - 1 sets - 2 reps - 30\"  hold  - Doorway Pec Stretch at 120 Elevation with Arm Straight  - 1 x daily - 7 x weekly - 1 sets - 2 reps - 30\" hold    Treatment:    Therapeutic Exercise: x 23 min  Elastic Band Pull Apart (Supine) 2 x 10, red theraband  cues for scap depression*  Supine Shoulder Ext (R) Stab @ 90 Deg Flexion (L) x 10 RTB  Supine Shoulder Ext (L) Stab @ 90 Deg Flexion (R) x 10 RTB  90 deg Doorway Pec Stretch: 2 x 30\" (B)   120 deg Doorway Pec Stretch: 2 x 30\" (B)   Added 1/2 FR Alternating Flex/Ext x 10  Added 1/2 FR 90-90 Shoulder Abduction   Added 1/2 FR \"Ys\" x 10     Manual Therapy: x 20 min  Soft tissue mobilization (supine): upper trap, levator, cervical paraspinals   Suboccipital release: x 3min   Cervical distraction: 3 x 30\"   Cervical upglide joint mobs - C3-C8: Grade 3, 2 x 10\" each (B)   Cupping to thoracic spinals x 5min    Provider Interactions With Patient:   Added therapeutic exercises as documented, with cueing provided throughout performance to ensure correct technique during exercise.  Verbally confirmed the patient's next appt date and time to ensure consistency with physical therapy attendance.     Assessment: Patient still presents with sharp pain in cervical spine, despite some improvement in radicular symptoms of LUE, still gets up to 7/10 centrally. Discussion with patient was had in regards to continue for 2-3 more visits and if sufficient progress has still not been made in functional gains and cervical pain, patient agrees best to return to referring MD and DC PT. Patient care was somewhat modified this day to include dynamic stretching for anterior shoulders to reduce kyphotic posture, also continued cupping to thoracic paraspinals and periscapular muscles bilaterally. Patient is able to actively perform scapular retraction with proper mechanics but demonstrates immediate UT engagement with any degree of elevation either active or resisted. Plan to continue max of 3 more visits then reassess, and consider DC if progression is lacking.    Goals:   Short-Term Goals:  The patient will improve cervical flexion AROM to 50deg pain-free to facilitate looking up for completing household chores that require looking down such as  cooking, washing dishes, etc.. Timeframe: 4-6 visits.  The patient will improve cervical rotation AROM to 35deg (B) pain-free to facilitate mobility for checking his/her blindspot for safety while driving a vehicle}. Timeframe: 6 visits.  The patient will improve cervical extension AROM to 50 deg pain-free to facilitate looking up for overhead work with household chores. Timeframe: 6-8 visits.  Long-Term Goals:  Patient will improve cervical neck pain and headache symptoms to facilitate sleeping at least at least 6 hours without interruption due to neck pain for adequate rest. Timeframe: 10-12 visits.  Patient will improve postural strength and upper body strength and mobility to facilitate washing her hair in the shower. Timeframe: 12 visits.   Patient will improve thoracic spine mobility and postural endurance to maintain proper posture with neutral head position while sitting to facilitate 4 hours of pain-free desk work for occupational activities.. Timeframe: 12 visits.  Patient will improve Neck Disability Index (NDI) score by 10 points or 10% to indicate a true change in improved function for ADL's and restoring PLF. Timeframe: 12 visits.    Plan: Continue to work on pain and symptom management and progress postural mobility and strength.       Charges: MT x 1, Therex x 2          Total Timed Treatment: 43 min    Total Treatment Time: 43 min

## 2024-11-12 ENCOUNTER — APPOINTMENT (OUTPATIENT)
Dept: PHYSICAL THERAPY | Age: 54
End: 2024-11-12
Attending: PHYSICIAN ASSISTANT
Payer: COMMERCIAL

## 2024-11-14 ENCOUNTER — APPOINTMENT (OUTPATIENT)
Dept: PHYSICAL THERAPY | Age: 54
End: 2024-11-14
Attending: PHYSICIAN ASSISTANT
Payer: COMMERCIAL

## 2024-11-19 ENCOUNTER — APPOINTMENT (OUTPATIENT)
Dept: PHYSICAL THERAPY | Age: 54
End: 2024-11-19
Attending: PHYSICIAN ASSISTANT
Payer: COMMERCIAL

## 2024-11-21 ENCOUNTER — APPOINTMENT (OUTPATIENT)
Dept: PHYSICAL THERAPY | Age: 54
End: 2024-11-21
Attending: PHYSICIAN ASSISTANT
Payer: COMMERCIAL

## 2024-11-25 ENCOUNTER — APPOINTMENT (OUTPATIENT)
Dept: PHYSICAL THERAPY | Age: 54
End: 2024-11-25
Attending: PHYSICIAN ASSISTANT
Payer: COMMERCIAL

## 2024-11-27 ENCOUNTER — APPOINTMENT (OUTPATIENT)
Dept: PHYSICAL THERAPY | Age: 54
End: 2024-11-27
Attending: PHYSICIAN ASSISTANT
Payer: COMMERCIAL

## 2025-02-17 RX ORDER — PANTOPRAZOLE SODIUM 40 MG/1
40 TABLET, DELAYED RELEASE ORAL DAILY
Qty: 90 TABLET | Refills: 0 | Status: SHIPPED | OUTPATIENT
Start: 2025-02-17

## 2025-02-26 ENCOUNTER — PATIENT OUTREACH (OUTPATIENT)
Dept: FAMILY MEDICINE CLINIC | Facility: CLINIC | Age: 55
End: 2025-02-26

## 2025-02-26 NOTE — PROGRESS NOTES
Letter mailed to patient reminding them they have outstanding orders.    Patient is due for a cervical cancer screening

## 2025-03-03 ENCOUNTER — HOSPITAL ENCOUNTER (OUTPATIENT)
Dept: GENERAL RADIOLOGY | Age: 55
Discharge: HOME | End: 2025-03-03
Attending: PHYSICIAN ASSISTANT
Payer: COMMERCIAL

## 2025-03-03 ENCOUNTER — OFFICE VISIT (OUTPATIENT)
Dept: PAIN CLINIC | Facility: CLINIC | Age: 55
End: 2025-03-03
Payer: COMMERCIAL

## 2025-03-03 VITALS
BODY MASS INDEX: 34 KG/M2 | WEIGHT: 200 LBS | OXYGEN SATURATION: 99 % | SYSTOLIC BLOOD PRESSURE: 132 MMHG | DIASTOLIC BLOOD PRESSURE: 84 MMHG | HEART RATE: 92 BPM

## 2025-03-03 DIAGNOSIS — G89.29 CHRONIC LEFT SHOULDER PAIN: Primary | ICD-10-CM

## 2025-03-03 DIAGNOSIS — M25.512 CHRONIC LEFT SHOULDER PAIN: Primary | ICD-10-CM

## 2025-03-03 DIAGNOSIS — M25.512 CHRONIC LEFT SHOULDER PAIN: ICD-10-CM

## 2025-03-03 DIAGNOSIS — G89.29 CHRONIC LEFT SHOULDER PAIN: ICD-10-CM

## 2025-03-03 PROCEDURE — 73030 X-RAY EXAM OF SHOULDER: CPT | Performed by: PHYSICIAN ASSISTANT

## 2025-03-03 PROCEDURE — 99214 OFFICE O/P EST MOD 30 MIN: CPT | Performed by: PHYSICIAN ASSISTANT

## 2025-03-03 RX ORDER — BISACODYL 5 MG/1
TABLET, DELAYED RELEASE ORAL AS DIRECTED
COMMUNITY
End: 2025-03-03 | Stop reason: ALTCHOICE

## 2025-03-03 RX ORDER — SIMETHICONE 125 MG
TABLET,CHEWABLE ORAL AS DIRECTED
COMMUNITY
End: 2025-03-03 | Stop reason: ALTCHOICE

## 2025-03-03 RX ORDER — PREDNISONE 20 MG/1
2.5 TABLET ORAL DAILY
COMMUNITY
End: 2025-03-03 | Stop reason: ALTCHOICE

## 2025-03-03 RX ORDER — POLYETHYLENE GLYCOL-3350 AND ELECTROLYTES 236; 6.74; 5.86; 2.97; 22.74 G/274.31G; G/274.31G; G/274.31G; G/274.31G; G/274.31G
POWDER, FOR SOLUTION ORAL AS DIRECTED
COMMUNITY
End: 2025-03-03 | Stop reason: ALTCHOICE

## 2025-03-03 NOTE — PATIENT INSTRUCTIONS
Refill policies:    Allow 2-3 business days for refills; controlled substances may take longer.  Contact your pharmacy at least 5 days prior to running out of medication and have them send an electronic request or submit request through the “request refill” option in your [employer] account.  Refills are not addressed on weekends; covering physicians do not authorize routine medications on weekends.  No narcotics or controlled substances are refilled after noon on Fridays or by on call physicians.  By law, narcotics must be electronically prescribed.  A 30 day supply with no refills is the maximum allowed.  If your prescription is due for a refill, you may be due for a follow up appointment.  To best provide you care, patients receiving routine medications need to be seen at least once a year.  Patients receiving narcotic/controlled substance medications need to be seen at least once every 3 months.  In the event that your preferred pharmacy does not have the requested medication in stock (e.g. Backordered), it is your responsibility to find another pharmacy that has the requested medication available.  We will gladly send a new prescription to that pharmacy at your request.    Scheduling Tests:    If your physician has ordered radiology tests such as MRI or CT scans, please contact Central Scheduling at [phone] right away to schedule the test.  Once scheduled, the [facility] Centralized Referral Team will work with your insurance carrier to obtain pre-certification or prior authorization.  Depending on your insurance carrier, approval may take 3-10 days.  It is highly recommended patients assure they have received an authorization before having a test performed.  If test is done without insurance authorization, patient may be responsible for the entire amount billed.      Precertification and Prior Authorizations:  If your physician has recommended that you have a procedure or additional testing performed the [facility]  Centralized Referral Team will contact your insurance carrier to obtain pre-certification or prior authorization.    You are strongly encouraged to contact your insurance carrier to verify that your procedure/test has been approved and is a COVERED benefit.  Although the [facility] Centralized Referral Team does its due diligence, the insurance carrier gives the disclaimer that \"Although the procedure is authorized, this does not guarantee payment.\"    Ultimately the patient is responsible for payment.   Thank you for your understanding in this matter.  Paperwork Completion:  If you require FMLA or disability paperwork for your recovery, please make sure to either drop it off or have it faxed to our office at [phone]. Be sure the form has your name and date of birth on it.  The form will be faxed to our Forms Department and they will complete it for you.  There is a 25$ fee for all forms that need to be filled out.  Please be aware there is a 10-14 day turnaround time.  You will need to sign a release of information (NASEEM) form if your paperwork does not come with one.  You may call the Forms Department with any questions at [phone].  Their fax number is [phone].

## 2025-03-03 NOTE — PROGRESS NOTES
HPI:   Charlotte Avalos presents with complaints of Neck pain radiating to Left scapular region and triceps, with numbness and tingling rarely into the extensor surface of the forearm through the third and fifth digits of the hand .    The pain is described as moderate aching, shooting that is intermittent.  The patient’s activity level has remained the same since last visit.  The pain is worst unrelated to time of day.    Changes in condition/history since last visit: Here today for follow up, having been seen last on 9/26/24.  At that time, MRI findings were relatively benign, and, regardless, she did not want to discuss injection or surgery.  As such, she was sent to acupuncture, though only went to one session.  States that she went to PT, to no relief.  She tried cupping, which helped some of the \"tightness\" though, overall, did little for her.      Last procedure: N/A    date: N/A    Percentage of relief experienced from the procedure: N/A %    Duration of the relief: N/A    The following activities will increase the patient’s pain: ROM C spine, use of L UE    The following activities decrease the patient’s pain: limiting activity level    Functional Assessment: Patient reports that they are able to complete all of their ADL's such as eating, bathing, using the toilet, dressing and getting up from a bed or a chair independently.    Current Medications:  Current Outpatient Medications   Medication Sig Dispense Refill    pantoprazole 40 MG Oral Tab EC Take 1 tablet (40 mg total) by mouth daily.      Halobetasol Propionate 0.05 % External Ointment Apply topically 2 (two) times daily.            Reviewed Patient History Dated: 8/15/224 no changes noted    Physical Exam:   /84   Pulse 92   Wt 200 lb (90.7 kg)   SpO2 99%   BMI 34.33 kg/m²   VAS Pain Score:  /10  General Appearance: Well developed, Well nourished, Independent body habitus, Well groomed, Obese    Neurological Exam: WNL-Orientation to time, place  and person, normal mood & effect, normal concentration & attention span  Inspection: non-antalgic, no acute distress  Pain with ROM C spine:  L rotation and B lateral bending  Pain with ROM L shoulder abduction and ER, with reduction in ROM with abduction and behind the back reach  Radiology/Lab Test Reviewed: MRI C spine:  CERVICAL DISC LEVELS:   C2-C3:  No significant disc/facet abnormality, spinal stenosis, or foraminal narrowing.   C3-C4:  Posterior disc osteophyte complex abuts the ventral thecal sac without significant central or foraminal stenosis.   C4-C5:  Central canal partially obscured by susceptibility artifact from fusion hardware.  No high-grade central or foraminal stenosis.   C5-C6:  Central canal partially obscured by susceptibility artifact from fusion hardware.  No high-grade central or foraminal stenosis.   C6-C7:  No significant disc/facet abnormality, spinal stenosis, or foraminal narrowing.   C7-T1:  No significant disc/facet abnormality, spinal stenosis, or foraminal narrowing.     No results found for: \"WBC\"No results found for: \"HEMOGLOBIN\"No results found for: \"PLT\"      Do you have any known blood/bleeding disorders?  No  Does patient currently take blood thinners?   None  Does patient currently take any antibiotics?   No  Patient educated and verbalized understanding.  Medical Decision Making:   Diagnosis:    Encounter Diagnosis   Name Primary?    Chronic left shoulder pain Yes     Impression: >20 year history of chronic neck and L shoulder/UE pain pain with history of 4 cervical surgeries, and is fused from C4-5 through C6-7.  No areas of significant stenosis, central or foraminal, on MRI, though again, does continue to complain of left upper extremity symptoms. We did discuss options, and she certainly wishes to avoid additional surgeries, and does not want to consider injections.  To that end, order was placed to have her work with physical therapy, and was given a referral to  acupuncture.  Only went to one session of acupuncture, to no benefit, though did go for PT, again, without relief.  Tried cupping, to brief improvement.      Today, while there is certainly a radicular component to her complaints, she has significant pain with ROM L shoulder, and has reduction in abduction and behind the back reach.  Would ask that she be evaluated by ortho for possible shoulder involvement.  Will see her back after ortho evaluation.      Plan: Patient to follow up PRN.  Order written for XR L shoulder and ortho eval.  If shoulder is not felt to be a meaningful contributor to her overall pain, follow up for any further options (cervical SCS?).      No orders of the defined types were placed in this encounter.      Meds & Refills for this Visit:  Requested Prescriptions      No prescriptions requested or ordered in this encounter       Imaging & Consults:  None    The patient indicates understanding of these issues and agrees to the plan.    MAGALIS Noriega

## 2025-03-03 NOTE — PROGRESS NOTES
Patient presents in office today with reported pain in left side cervical radiculopathy    Current pain level reported = 8/10    Last reported dose of nothing      Narcotic Contract renewal na    Urine Drug screen na

## 2025-03-07 ENCOUNTER — SPINE CENTER NAVIGATION (OUTPATIENT)
Age: 55
End: 2025-03-07

## 2025-03-07 NOTE — PROGRESS NOTES
Spine Center Referral Navigation Encounter Note    Referred by: MAGALIS Chappell    Imaging: MRI Cervical Spine 9/19/24  If imaging done at an external facility, instructed patient to bring disc of MRI to appointment.     Previously Seen Spine Care Providers: Charlie Lucio PA-C    Referred to: Ramon Bhakta MD in Neurosurgery   Scheduled with surgeon per Charlie. Patient requested to see Neurosurgery.    3/7-  Called patient but unable to reach. Line just kept ringing.    3/7- Spoke to patient and was able to schedule for 3/10 at 9 am.

## 2025-03-10 ENCOUNTER — OFFICE VISIT (OUTPATIENT)
Dept: SURGERY | Facility: CLINIC | Age: 55
End: 2025-03-10
Payer: COMMERCIAL

## 2025-03-10 VITALS
HEIGHT: 64 IN | BODY MASS INDEX: 30.73 KG/M2 | WEIGHT: 180 LBS | HEART RATE: 105 BPM | DIASTOLIC BLOOD PRESSURE: 80 MMHG | SYSTOLIC BLOOD PRESSURE: 130 MMHG

## 2025-03-10 DIAGNOSIS — M54.12 CERVICAL RADICULOPATHY AT C8: Primary | ICD-10-CM

## 2025-03-10 PROCEDURE — 99204 OFFICE O/P NEW MOD 45 MIN: CPT | Performed by: NEUROLOGICAL SURGERY

## 2025-03-10 RX ORDER — GABAPENTIN 300 MG/1
300 CAPSULE ORAL 3 TIMES DAILY
Qty: 90 CAPSULE | Refills: 2 | Status: SHIPPED | OUTPATIENT
Start: 2025-03-10

## 2025-03-10 NOTE — H&P
Neurosurgery Clinic Visit  3/10/2025    Charlotte Avalos PCP:  Aidan Bourgeois MD    1970 MRN [id number]       CHIEF COMPLAINT:  Chief Complaint   Patient presents with    New Patient    Neck Pain       HISTORY OF PRESENT ILLNESS:  Charlotte Avalos is a(n) 54 year old female presents for evaluation of neck and arm pain.  Patient states she is having a sharp pain in her neck that shoots down her left arm to her fourth and fifth fingers.  Those fourth and fifth fingers tingle.  This started back in August.  She notes she has had neck pain for over 20 years.  Recently has been getting worse.  She was not having the pain down her arm.  The pain in her arms got worse.  Her usual pain is a headache and neck pain.  She describes the pain as sharp.  Nothing makes it better or worse.  She is done physical therapy which is not helping.  She tried some acupuncture which it did not help.  She does some home exercises which did not help.  She has not had any injections this time around but is had had them in the past.  In the past they did not help.  She has been seen by pain management and any further recommendations.  She has not had an EMG this time around but she has had an EMG in the past.  She has had 4 neck surgeries.  She is fused from C4-C7.  Her last fusion surgery was in  was a revision of her second surgery.  These were done in [facility].  She notes she cannot get in a comfortable position.  All the pains down her left arm her right arm is fine.  She presents for evaluation.    REVIEW OF SYSTEMS:  The 12 point checklist was reviewed and was negative except: See above    ALLERGIES:  Allergies[1]    MEDICATIONS:  Current Outpatient Medications   Medication Sig Dispense Refill    gabapentin 300 MG Oral Cap Take 1 capsule (300 mg total) by mouth 3 (three) times daily. 90 capsule 2    pantoprazole 40 MG Oral Tab EC Take 1 tablet (40 mg total) by mouth daily.      Halobetasol Propionate  0.05 % External Ointment Apply topically 2 (two) times daily.         HISTORY:  Past Medical History:    Essential hypertension     Past Surgical History:   Procedure Laterality Date    Back surgery  01/01/2013    cer fusion revision     Other surgical history  2003    C5-C6 Fusion    Other surgical history  2007    C6-C7 FUSION    Spinal fusion  2002    C5 -c 6 FUSION     History reviewed. No pertinent family history.  Charlotte  reports that she has been smoking cigarettes. She has never used smokeless tobacco. She reports current alcohol use. She reports that she does not use drugs.    PHYSICAL EXAMINATION:  Vital Signs:  /80 (BP Location: Right arm, Patient Position: Sitting, Cuff Size: adult)   Pulse 105   Ht 64\"   Wt 180 lb (81.6 kg)   BMI 30.90 kg/m²   General: The patient is in no acute distress.  HEENT: The head is atraumatic and normocephalic.  The eyes, ears, nose and throat are clear. The pupils are equal, round, and reactive to light.  Hearing is intact and symmetric.  Muscular:  Exam reveals normal bulk and tone.  Neurologic Exam: The patient is oriented to time, person and place.  Memory, attention span, language findings, and knowledge and insight into the problem appear intact and appropriate.  Cranial nerve Exam:  Visual fields are full.  The pupils are equal, round, and reactive to light.  The extraocular movements are intact.  Facial sensation in intact.  Facial symmetry and movement of the face is intact.  Hearing appears to be intact and symmetric.  Elevation of the palate appears symmetric as well.  Shoulder shrug is intact and symmetric.  The tongue is in the midline.    Strength:     Biceps Triceps Deltoids Wrist Extension Hand Intrinsics   Left 5/5 5/5 5/5 5/5 5/5   Right 5/5 5/5 5/5 5/5 5/5      Hip Flexion Hip Adduction Hip Abduction Knee Flexion Knee Extension Plantar- flexion Dorsi- flexion EHL   Left 5/5 5/5 5/5 5/5 5/5 5/5 5/5 5/5   Right 5/5 5/5 5/5 5/5 5/5 5/5 5/5 5/5      DTRs:   Biceps Triceps Brachio Patella Ankle   Left 2+ 2+ 2+ 2+ 2+   Right 2+ 2+ 2+ 2+ 2+     Clonus:  Absent.  Babinski: Deferred  Maddox's:  Absent.  Romberg:  Negative.  Pronator drift: Absent.  Sensation:  Intact to light touch in all tested dermatomes in the upper and lower extremities.    Coordination:  Appears to be intact on finger-to-nose testing and tandem walking.  Gait and station:  Normal.  Able to heel, toe, and tandem walk.  Spine: Lhermitte's negative, Spurling's positive to the left, nontender to palpation along midline, points to left side of her neck where she has the pain.    REVIEW OF STUDIES:  MRI reviewed which shows some foraminal disease to the left at C7/T1  X-rays show prior fusion    ASSESSMENT AND PLAN:  54-year-old female with left arm pain and likely C8 radiculopathy  We long discussion about treatment options  Will place her back on gabapentin  She is not rested and getting an injection  Briefly discussed the foraminotomies  Reviewed films in detail  All questions were answered she will call me in a couple weeks to give me an update  Patient appreciative          Time spent on counseling/coordination of care:  45 Minutes    Total time spent with patient:  45 Minutes      Ramon Bhakta MD   [facility]  3/10/2025  9:53 AM   Dictated but not proofread       [1] No Known Allergies

## 2025-03-10 NOTE — PATIENT INSTRUCTIONS
Refill policies:    Allow 2-3 business days for refills; controlled substances may take longer.  Contact your pharmacy at least 5 days prior to running out of medication and have them send an electronic request or submit request through the “request refill” option in your [email or web address] account.  Refills are not addressed on weekends; covering physicians do not authorize routine medications on weekends.  No narcotics or controlled substances are refilled after noon on Fridays or by on call physicians.  By law, narcotics must be electronically prescribed.  A 30 day supply with no refills is the maximum allowed.  If your prescription is due for a refill, you may be due for a follow up appointment.  To best provide you care, patients receiving routine medications need to be seen at least once a year.  Patients receiving narcotic/controlled substance medications need to be seen at least once every 3 months.  In the event that your preferred pharmacy does not have the requested medication in stock (e.g. Backordered), it is your responsibility to find another pharmacy that has the requested medication available.  We will gladly send a new prescription to that pharmacy at your request.    Scheduling Tests:    If your physician has ordered radiology tests such as MRI or CT scans, please contact Central Scheduling at [phone] right away to schedule the test.  Once scheduled, the [facility] Centralized Referral Team will work with your insurance carrier to obtain pre-certification or prior authorization.  Depending on your insurance carrier, approval may take 3-10 days.  It is highly recommended patients assure they have received an authorization before having a test performed.  If test is done without insurance authorization, patient may be responsible for the entire amount billed.      Precertification and Prior Authorizations:  If your physician has recommended that you have a procedure or additional testing performed the [facility]  Centralized Referral Team will contact your insurance carrier to obtain pre-certification or prior authorization.    You are strongly encouraged to contact your insurance carrier to verify that your procedure/test has been approved and is a COVERED benefit.  Although the [facility] Centralized Referral Team does its due diligence, the insurance carrier gives the disclaimer that \"Although the procedure is authorized, this does not guarantee payment.\"    Ultimately the patient is responsible for payment.   Thank you for your understanding in this matter.  Paperwork Completion:  If you require FMLA or disability paperwork for your recovery, please make sure to either drop it off or have it faxed to our office at [phone]. Be sure the form has your name and date of birth on it.  The form will be faxed to our Forms Department and they will complete it for you.  There is a 25$ fee for all forms that need to be filled out.  Please be aware there is a 10-14 day turnaround time.  You will need to sign a release of information (NASEEM) form if your paperwork does not come with one.  You may call the Forms Department with any questions at [phone].  Their fax number is [phone].

## 2025-03-10 NOTE — PROGRESS NOTES
New patient:  Reason for visit: cervical pain    Estimated time of onset:  20 years but pain was getting worst lasted year     Numeric Rating Scale:     Pain at Present:  6/10                                                                                                                       Distribution of Pain:    left shooting pain in the left arm and N/T in her fingers on the left hand. States she dropping things     Past Treatments for Current Pain Condition:     4 passed next surgeries    States she had done PT bout in 10/2024  States she had injections in the passed   Response to treatment: no relief    Prior diagnostic testing related to this condition:  imaging in chart

## 2025-03-24 ENCOUNTER — TELEPHONE (OUTPATIENT)
Dept: SURGERY | Facility: CLINIC | Age: 55
End: 2025-03-24

## 2025-03-24 DIAGNOSIS — M54.12 CERVICAL RADICULOPATHY AT C8: ICD-10-CM

## 2025-03-24 NOTE — TELEPHONE ENCOUNTER
Message below noted.    Patient states she was advised by Provider to give condition update after LOV. Called patient to discuss.    Patient states last few days she has had a lot of pain shoot down both her arms. Especially with outwards movement. Patient states she has a little tingling in fourth and fifth fingers on left hand. Patient denies any issues with her lower extremities. Patient rates pain currently 7.5/10.    Patient requesting increase in Gabapentin due to increase in pain. Advised we will discuss with Providers and reach back out regarding request.    Patient acknowledged and appreciative of call.     LOV 3/10/25  \"ASSESSMENT AND PLAN:  54-year-old female with left arm pain and likely C8 radiculopathy  We long discussion about treatment options  Will place her back on gabapentin  She is not rested and getting an injection  Briefly discussed the foraminotomies  Reviewed films in detail  All questions were answered she will call me in a couple weeks to give me an update  Patient appreciative\"    Routed to Provider.

## 2025-03-24 NOTE — TELEPHONE ENCOUNTER
Message below noted.    Called patient to advise. Advised patient to reach back out with any other questions or concerns.    Patient is active on [employer].  message sent.

## 2025-03-24 NOTE — TELEPHONE ENCOUNTER
Recommend that patient increase to 300 mg morning, 300 mg afternoon, and 600 mg at bedtime. Please have patient let us know how this dosage is working for her and we can make further adjustments as needed. She was dispensed an Rx on 3/10 with 2 refills from Dr. Bhakta, so a refill has not been provided at this time.

## 2025-03-24 NOTE — TELEPHONE ENCOUNTER
Pt states Dr Bhakta asked her to give condition update in a few weeks after last ov.Pt asking if can increase gabapentin as past couple of days her pain in arms has increased.Please call to advise.

## 2025-03-31 ENCOUNTER — PATIENT OUTREACH (OUTPATIENT)
Dept: FAMILY MEDICINE CLINIC | Facility: CLINIC | Age: 55
End: 2025-03-31

## 2025-03-31 ENCOUNTER — OFFICE VISIT (OUTPATIENT)
Dept: FAMILY MEDICINE CLINIC | Facility: CLINIC | Age: 55
End: 2025-03-31
Payer: COMMERCIAL

## 2025-03-31 VITALS
HEART RATE: 69 BPM | RESPIRATION RATE: 16 BRPM | BODY MASS INDEX: 36.19 KG/M2 | SYSTOLIC BLOOD PRESSURE: 140 MMHG | OXYGEN SATURATION: 99 % | WEIGHT: 212 LBS | DIASTOLIC BLOOD PRESSURE: 90 MMHG | HEIGHT: 64 IN | TEMPERATURE: 98 F

## 2025-03-31 DIAGNOSIS — R03.0 ELEVATED BP WITHOUT DIAGNOSIS OF HYPERTENSION: ICD-10-CM

## 2025-03-31 DIAGNOSIS — Z12.11 COLON CANCER SCREENING: ICD-10-CM

## 2025-03-31 DIAGNOSIS — F41.9 ANXIETY: ICD-10-CM

## 2025-03-31 DIAGNOSIS — Z00.00 WELLNESS EXAMINATION: Primary | ICD-10-CM

## 2025-03-31 DIAGNOSIS — Z71.6 ENCOUNTER FOR TOBACCO USE CESSATION COUNSELING: ICD-10-CM

## 2025-03-31 DIAGNOSIS — L30.9 ECZEMA, UNSPECIFIED TYPE: ICD-10-CM

## 2025-03-31 DIAGNOSIS — Z13.220 LIPID SCREENING: ICD-10-CM

## 2025-03-31 DIAGNOSIS — F17.200 SMOKER: ICD-10-CM

## 2025-03-31 DIAGNOSIS — Z12.31 ENCOUNTER FOR SCREENING MAMMOGRAM FOR MALIGNANT NEOPLASM OF BREAST: ICD-10-CM

## 2025-03-31 PROCEDURE — 99396 PREV VISIT EST AGE 40-64: CPT | Performed by: FAMILY MEDICINE

## 2025-03-31 PROCEDURE — 90471 IMMUNIZATION ADMIN: CPT | Performed by: FAMILY MEDICINE

## 2025-03-31 PROCEDURE — 99406 BEHAV CHNG SMOKING 3-10 MIN: CPT | Performed by: FAMILY MEDICINE

## 2025-03-31 PROCEDURE — 99213 OFFICE O/P EST LOW 20 MIN: CPT | Performed by: FAMILY MEDICINE

## 2025-03-31 PROCEDURE — 90472 IMMUNIZATION ADMIN EACH ADD: CPT | Performed by: FAMILY MEDICINE

## 2025-03-31 PROCEDURE — 90715 TDAP VACCINE 7 YRS/> IM: CPT | Performed by: FAMILY MEDICINE

## 2025-03-31 PROCEDURE — 90677 PCV20 VACCINE IM: CPT | Performed by: FAMILY MEDICINE

## 2025-03-31 RX ORDER — BUPROPION HYDROCHLORIDE 150 MG/1
150 TABLET ORAL DAILY
Qty: 30 TABLET | Refills: 0 | Status: SHIPPED | OUTPATIENT
Start: 2025-03-31

## 2025-03-31 RX ORDER — VARENICLINE TARTRATE 0.5 (11)-1
1 KIT ORAL
Qty: 28 EACH | Refills: 0 | Status: CANCELLED | OUTPATIENT
Start: 2025-03-31

## 2025-03-31 RX ORDER — TRIAMCINOLONE ACETONIDE 1 MG/G
CREAM TOPICAL
Qty: 60 G | Refills: 3 | Status: SHIPPED | OUTPATIENT
Start: 2025-03-31

## 2025-03-31 RX ORDER — CETIRIZINE HYDROCHLORIDE 1 MG/ML
5 SOLUTION ORAL DAILY
COMMUNITY

## 2025-03-31 NOTE — PROGRESS NOTES
Charlotte Avalos is a 54 year old female.   Chief Complaint   Patient presents with    Physical     Talk about quitting smoking     Blood Pressure    Anxiety     HPI:1.  Cpx: 54-year-old female comes in for her physical as well as other concerns.    Patient states that she is ready to quit smoking.  Patient states that her  was on medicine Chantix that helped really well for him.  Patient mentally is at that point where she wants to let go of her habit.  Patient does state that she also has a history of anxiety she would like to have that handled as well.  She denies any homicidal thoughts or any suicidal thoughts.  Patient has not tried any medications in the past.    Patient states that she has noticed her blood pressure going up as well.  She would like to know if there is any intervention that is needed at this point.  Patient denies any chest pain shortness of breath or any palpitations.    Past Medical History:    Essential hypertension       Past Surgical History:   Procedure Laterality Date    Back surgery  01/01/2013    cer fusion revision     Other surgical history  2003    C5-C6 Fusion    Other surgical history  2007    C6-C7 FUSION    Spinal fusion  2002    C5 -c 6 FUSION       Family History   Problem Relation Age of Onset    Dementia Mother     Diabetes Father     Diabetes Paternal Grandmother     Diabetes Paternal Grandfather        SOCIAL HISTORY:    Social History     Socioeconomic History    Marital status:    Tobacco Use    Smoking status: Every Day     Current packs/day: 0.50     Types: Cigarettes     Passive exposure: Never    Smokeless tobacco: Never    Tobacco comments:     Will start wellbutrin   Substance and Sexual Activity    Alcohol use: Yes     Comment: socially    Drug use: No   Other Topics Concern    Caffeine Concern No    Exercise No       ALLERGIES:  Allergies[1]   CURRENT MEDS:  Current Outpatient Medications   Medication Sig Dispense Refill    cetirizine 1 MG/ML Oral  Solution Take 5 mL (5 mg total) by mouth daily.      buPROPion  MG Oral Tablet 24 Hr Take 1 tablet (150 mg total) by mouth daily. 30 tablet 0    triamcinolone 0.1 % External Cream Apply ot affected skin 2 times a day for 14 days max 60 g 3    gabapentin 300 MG Oral Cap Take 1 capsule (300 mg total) by mouth 3 (three) times daily. 90 capsule 2    pantoprazole 40 MG Oral Tab EC Take 1 tablet (40 mg total) by mouth daily.      Halobetasol Propionate 0.05 % External Ointment Apply topically 2 (two) times daily.          REVIEW OF SYSTEMS:   GENERAL HEALTH: Feels well overall  SKIN: Denies any unusual skin lesions or rashes  EYES: No visual complaints or deficits  HEENT: Denies nasal congestion, sinus pain or sore throat;   RESPIRATORY: Denies shortness of breath, wheezing or cough   CARDIOVASCULAR: Denies chest pain, CHAVARRIA; palpitations   GI: Denies nausea, vomiting, constipation, diarrhea; no rectal bleeding; no heartburn  GENITAL/: No LMP recorded. (Menstrual status: Menopause)..  No vaginal complaints.  MUSCULOSKELETAL: No joint complaints upper or lower extremities  NEURO: No sensory or motor complaint  PSYCHE: No symptoms of depression or anxiety  HEMATOLOGY: Denies excessive bleeding, bruising  ENDOCRINE: No thyroid symptoms, no polyuria,no polydipsia  ALLERGY/IMMUN: No allergy symptoms    PHYSICAL EXAM:   GENERAL: Well developed, well nourished, in no apparent distress  SKIN: Eczematous changes on hands   EYES: PERRL, EOMI, sclera anicteric, conjunctiva normal  HEENT: Normocephalic;  Pharynx: unremarkable  NECK: Supple; full ROM, no JVD,Thyroid not enlarged , no carotid bruits  RESPIRATORY: Bilaterally clear to auscultation, no wheezing, no rales,bilateral good air entry  CARDIOVASCULAR: S1, S2 normal, RRR; no S3, no S4; no click; no murmur  ABDOMEN: Soft, nondistended; no HSM; no masses; nontender  LYMPHATIC: no lymphadenopathy  MUSCULOSKELETAL: Full painless ROM of U/E and L/E joints,no joint  effusion  EXTREMITIES: No cyanosis, clubbing or edema, peripheral pulses intact  NEUROLOGIC:Motor power 5/5 all over, Cranial nerves 2-12: unremarkable; no sensory deficits  PSYCHIATRIC: Alert and oriented x 3; affect appropriate  Gyn:  no pap performed    ASSESSMENT/PLAN:     Diagnoses and all orders for this visit:    Wellness examination  -     Comp Metabolic Panel (14); Future  -     Lipid Panel; Future  -     OBG Referral - In Network    Anxiety  -     OFFICE/OUTPT VISIT,EST,LEVL III    Encounter for screening mammogram for malignant neoplasm of breast  -     3D Mammogram Digital Screen, Bilateral (CPT=77067/44768); Future    Colon cancer screening    Lipid screening  -     Lipid Panel; Future    Elevated BP without diagnosis of hypertension  -     CT CALCIUM SCORING; Future    Smoker  -     CT CALCIUM SCORING; Future    Encounter for tobacco use cessation counseling  -     Smoking Cessation 3-10 minutes (61963)    Eczema, unspecified type  -     OFFICE/OUTPT VISIT,EST,LEVL III    Other orders  -     TdaP (Adacel, Boostrix) [56790]  -     Prevnar 20 (PCV20) [71135]  -     buPROPion  MG Oral Tablet 24 Hr; Take 1 tablet (150 mg total) by mouth daily.  -     triamcinolone 0.1 % External Cream; Apply ot affected skin 2 times a day for 14 days max    Discussed preventative care, fasting labs.  Recommend coronary calcium score due to elevated risk due to history of smoking.    Long discussion with patient regarding her smoking habit.  I believe patient will be a good candidate to try Wellbutrin 150 mg daily given that she has issues with anxiety as well and hopefully we can manage both with 1 medication.  Like to see patient in about a month to reassess and would appreciate a phone call in 2 weeks to have an update on her situation with her smoking and anxiety.    When patient comes back in a month we will see how her blood pressure is doing and will determine if we need to have intervention at that point with  medication.    Recommended triamcinolone twice a day no more than 14 days to help with the eczema on her hands.  After that can use a different kind of lotion and after a week may resume triamcinolone for another 14 days.  As long as she alternates 2 weeks on 1 week off he should work well.    The patient verbalizes understanding of diagnosis, treatment plan and agrees to the plan of care.       [1] No Known Allergies

## 2025-04-01 ENCOUNTER — LABORATORY ENCOUNTER (OUTPATIENT)
Dept: LAB | Age: 55
End: 2025-04-01
Attending: FAMILY MEDICINE
Payer: COMMERCIAL

## 2025-04-01 DIAGNOSIS — Z00.00 WELLNESS EXAMINATION: ICD-10-CM

## 2025-04-01 DIAGNOSIS — Z13.220 LIPID SCREENING: ICD-10-CM

## 2025-04-01 LAB
ALBUMIN SERPL-MCNC: 4.7 G/DL (ref 3.2–4.8)
ALBUMIN/GLOB SERPL: 1.9 {RATIO} (ref 1–2)
ALP LIVER SERPL-CCNC: 132 U/L
ALT SERPL-CCNC: 26 U/L
ANION GAP SERPL CALC-SCNC: 6 MMOL/L (ref 0–18)
AST SERPL-CCNC: 22 U/L (ref ?–34)
BILIRUB SERPL-MCNC: 0.6 MG/DL (ref 0.3–1.2)
BUN BLD-MCNC: 11 MG/DL (ref 9–23)
CALCIUM BLD-MCNC: 9.6 MG/DL (ref 8.7–10.6)
CHLORIDE SERPL-SCNC: 105 MMOL/L (ref 98–112)
CHOLEST SERPL-MCNC: 227 MG/DL (ref ?–200)
CO2 SERPL-SCNC: 29 MMOL/L (ref 21–32)
CREAT BLD-MCNC: 0.81 MG/DL
EGFRCR SERPLBLD CKD-EPI 2021: 87 ML/MIN/1.73M2 (ref 60–?)
FASTING PATIENT LIPID ANSWER: YES
FASTING STATUS PATIENT QL REPORTED: YES
GLOBULIN PLAS-MCNC: 2.5 G/DL (ref 2–3.5)
GLUCOSE BLD-MCNC: 92 MG/DL (ref 70–99)
HDLC SERPL-MCNC: 56 MG/DL (ref 40–59)
LDLC SERPL CALC-MCNC: 151 MG/DL (ref ?–100)
NONHDLC SERPL-MCNC: 171 MG/DL (ref ?–130)
OSMOLALITY SERPL CALC.SUM OF ELEC: 289 MOSM/KG (ref 275–295)
POTASSIUM SERPL-SCNC: 4.6 MMOL/L (ref 3.5–5.1)
PROT SERPL-MCNC: 7.2 G/DL (ref 5.7–8.2)
SODIUM SERPL-SCNC: 140 MMOL/L (ref 136–145)
TRIGL SERPL-MCNC: 111 MG/DL (ref 30–149)
VLDLC SERPL CALC-MCNC: 21 MG/DL (ref 0–30)

## 2025-04-01 PROCEDURE — 80053 COMPREHEN METABOLIC PANEL: CPT

## 2025-04-01 PROCEDURE — 80061 LIPID PANEL: CPT

## 2025-04-01 PROCEDURE — 36415 COLL VENOUS BLD VENIPUNCTURE: CPT

## 2025-04-14 ENCOUNTER — PATIENT MESSAGE (OUTPATIENT)
Dept: SURGERY | Facility: CLINIC | Age: 55
End: 2025-04-14

## 2025-04-14 NOTE — TELEPHONE ENCOUNTER
Noted patient has messaged that her pain in the head, neck and L shoulder/arm has worsened in the last week making daily activities such as washing hair and getting dressed more difficult.    She is asking if there is anything she can do besides injections for her pain.      Per chart review, LOV with Dr Bhakta 3/10/25:  \"ASSESSMENT AND PLAN:  54-year-old female with left arm pain and likely C8 radiculopathy  We long discussion about treatment options  Will place her back on gabapentin  She is not rested and getting an injection  Briefly discussed the foraminotomies  Reviewed films in detail  All questions were answered she will call me in a couple weeks to give me an update  Patient appreciative\"    Noted patient is already established with Pain Management although no injection scheduled at this time.     I inquired with the patient about scheduling with Pain Management re injection and told her I would see if any adjusting to her gabapentin could be done.     Her gabapentin was recently increased to 300mg in the morning, 300mg in the afternoon and 600mg in the evening on 3/24 by Jessica FAM.

## 2025-04-14 NOTE — TELEPHONE ENCOUNTER
Noted patients reply stating she has had injections in the past with no relief. She states she discusses this with Dr Bhakta and pain management.     Will follow up with Dr Bhakta and get back to her.

## 2025-04-18 ENCOUNTER — TELEPHONE (OUTPATIENT)
Dept: SURGERY | Facility: CLINIC | Age: 55
End: 2025-04-18

## 2025-04-18 ENCOUNTER — OFFICE VISIT (OUTPATIENT)
Dept: SURGERY | Facility: CLINIC | Age: 55
End: 2025-04-18
Payer: COMMERCIAL

## 2025-04-18 VITALS
SYSTOLIC BLOOD PRESSURE: 136 MMHG | HEART RATE: 87 BPM | WEIGHT: 185 LBS | HEIGHT: 64 IN | DIASTOLIC BLOOD PRESSURE: 84 MMHG | BODY MASS INDEX: 31.58 KG/M2 | OXYGEN SATURATION: 98 %

## 2025-04-18 DIAGNOSIS — M54.12 CERVICAL RADICULOPATHY AT C8: Primary | ICD-10-CM

## 2025-04-18 DIAGNOSIS — M54.12 CERVICAL RADICULOPATHY AT C8: ICD-10-CM

## 2025-04-18 PROCEDURE — 99213 OFFICE O/P EST LOW 20 MIN: CPT | Performed by: NEUROLOGICAL SURGERY

## 2025-04-18 NOTE — PATIENT INSTRUCTIONS
Refill policies:    Allow 2-3 business days for refills; controlled substances may take longer.  Contact your pharmacy at least 5 days prior to running out of medication and have them send an electronic request or submit request through the “request refill” option in your [employer] account.  Refills are not addressed on weekends; covering physicians do not authorize routine medications on weekends.  No narcotics or controlled substances are refilled after noon on Fridays or by on call physicians.  By law, narcotics must be electronically prescribed.  A 30 day supply with no refills is the maximum allowed.  If your prescription is due for a refill, you may be due for a follow up appointment.  To best provide you care, patients receiving routine medications need to be seen at least once a year.  Patients receiving narcotic/controlled substance medications need to be seen at least once every 3 months.  In the event that your preferred pharmacy does not have the requested medication in stock (e.g. Backordered), it is your responsibility to find another pharmacy that has the requested medication available.  We will gladly send a new prescription to that pharmacy at your request.    Scheduling Tests:    If your physician has ordered radiology tests such as MRI or CT scans, please contact Central Scheduling at [phone] right away to schedule the test.  Once scheduled, the [facility] Centralized Referral Team will work with your insurance carrier to obtain pre-certification or prior authorization.  Depending on your insurance carrier, approval may take 3-10 days.  It is highly recommended patients assure they have received an authorization before having a test performed.  If test is done without insurance authorization, patient may be responsible for the entire amount billed.      Precertification and Prior Authorizations:  If your physician has recommended that you have a procedure or additional testing performed the [facility]  Centralized Referral Team will contact your insurance carrier to obtain pre-certification or prior authorization.    You are strongly encouraged to contact your insurance carrier to verify that your procedure/test has been approved and is a COVERED benefit.  Although the [facility] Centralized Referral Team does its due diligence, the insurance carrier gives the disclaimer that \"Although the procedure is authorized, this does not guarantee payment.\"    Ultimately the patient is responsible for payment.   Thank you for your understanding in this matter.  Paperwork Completion:  If you require FMLA or disability paperwork for your recovery, please make sure to either drop it off or have it faxed to our office at [phone]. Be sure the form has your name and date of birth on it.  The form will be faxed to our Forms Department and they will complete it for you.  There is a 25$ fee for all forms that need to be filled out.  Please be aware there is a 10-14 day turnaround time.  You will need to sign a release of information (NASEEM) form if your paperwork does not come with one.  You may call the Forms Department with any questions at [phone].  Their fax number is [phone].     You are scheduled for Left C7-T1 Foraminotomy on 5.14.25 with Dr. Bhakta at [facility].     PCP clearance is needed.  We have faxed a request for pre-op clearance to your PCP Dr Keith Bourgeois. Please contact their office for appointment.      You will need  pre operative labs which will include MRSA/MSSA testing.  You will need to contact the Pre-admission department at [phone] to schedule an appointment for your labs.     The Pre-Admission nurse will review your health history and give you information from the hospital. The nurse will also get you scheduled for all your pre-op testing required for your surgery.     Do not take any blood thinning medications such as over the counter non-steroidal antiinflammatories (advil, aleve,  ibuprofen etc.), herbal supplements (garlic,tumeric etc.), vitamin E, fish oil or krill oil for at least 7 days prior to surgery. You may only take Tylenol, Extra Strength Tylenol, Arthritis Tylenol, or prescription Norco or Tramadol for pain if something is needed.    You should have nothing to eat or drink after 11:00pm the night prior to surgery except for the following:    Do drink 12 ounces of regular Gatorade (NOT RED) 12 hours and 4 hours prior to your scheduled surgery time, Do not drink any other liquids (including water) before your surgery. Do not chew gum or eat candies before surgery.  Take 1000 mg of Tylenol (Acetaminophen) 4 hours before your scheduled surgery time, take this with your scheduled Gatorade.    In order to prevent infection, you will need to purchase Hibiclens soap and use it after your regular body soap for 5 days prior to your procedure.  The last shower should be the night before surgery.  This soap can be found at any pharmacy in the first aid section. See detailed instructions below.      Our office will get prior authorization for surgery through your insurance.     Surgery is usually scheduled as a 1 day admission.  This is an estimate and varies from person to person.  Ultimately, the surgeon will determine when you are ready to be discharged.    The hospital will contact you 1-2 days before surgery with your arrival time.     If you were on blood thinners (such as Coumadin, Pradaxa, Xarelto, etc) prior to surgery that we had you stop for surgery, please make sure you get instructions about when to resume the medication before you are discharged from the hospital.    Post operative appointments to be made 2 weeks before surgery as well as 2 and 6 weeks after surgery.      Your 2 week pre-op surgical telehealth appointment is on 4.30.25 at 9:45 am with the Spine Navigator RN    Your 2 week post-op appointment is on 5.27.25 at 11:30 am with SARWAT [place]    Your 6 week  post-op appointment is on 6.26.25 at 11:30 am with Dr Bhakta        Restrictions for after surgery:  Lifting restriction of 10 pounds or less.  No bending or twisting.  No prolonged sitting or standing.  Driving is restricted for the first 1-2 weeks (this will vary from surgeon to surgeon.)  Fusion patients may require bracing such as TLSO or LSO brace. Braces are custom made to fit the patient.  Patient's are to keep their incisions covered for the first 3 days post surgery. After that they may leave the incision open to air. It is better for the healing process if the incision is left open to air.   May shower after the third day.  Do not scrub the incision and avoid any lotions or creams to the incision.    Please make sure to arrive at least 15 minutes prior to your scheduled appointment in order to get checked in and roomed in a timely manner.  Depending on provider availability, late patients may be required to reschedule.  REFILLS:  After surgery, please remember that we do have a 48 hour refill policy that does not include weekends, please make sure to request your medications in a timely manner so that you do not go days without medication.  *Refills should be requested through your pharmacy or through the refill request in [employer] (log in, go to medications, then select refill request).  [employer] MESSAGING:  Please remember that our office is closed during the weekend and no one is available for [employer] messages. If you have an urgent or emergent matter please go to a walk-in center or the emergency room. Also please remember your [employer] messages are part of your legal medical record and should not be utilized as a personal email with our providers as it is visible to all [facility] employees. Also, Since [employer] messages are not for emergent matters it may be several days before there is a response to your message.  FMLA/PAPERWORK COMPLETED BY OUR MEDICAL FORMS DEPARTMENT:  If you require FMLA  paperwork for your surgery, please make sure to either Drop it off or have it faxed to our office at [phone]. Make sure it has your NAME, , and has your signature. You will need to have a Release of Information on file. To facilitate this process we ask that you requested it at the  on your way out and sign it. Without a signed NASEEM or signature on the form we will not be able to fax it and this will cause a delay with your forms. Fees charged for forms are $25 for initial submission and $15 for recertifications. If you have questions on the status of your forms please call the forms department at [phone].  **We do have a 3 week policy for all forms and paperwork, please make sure to allow plenty of time for completion. Same day paperwork will not be completed. **    Spine Navigator  You will have a 30 minute telehealth visit with our spine navigator approximately 2 weeks before your surgery. This visit is NOT optional. This appointment will get booked when you schedule your spinal surgery.  When speaking with Pre-admissions you will be told about a spine class as it relates to your surgery, this is an OPTIONAL class. The same or similar information will be discussed with you during your telehealth appointment with the spine navigator (Spine Navigator appointment is not optional). However, if you would like to have this information repeated to you or if you would feel more comfortable with additional information, you can elect to schedule this class during your pre-admissions phone call.  The Pre-op Spine Surgery Class is held at [facility] most  from 3:30-4:30 pm. Call Pre-admission Testing (PAT) to register at:  [phone]. Please park in the [facility] [employer] garage, check in at registration and meet in the [facility] lobby for your escort to class on the Ortho Spine unit. If unable to attend, but would like additional information, the class is available online at  [email or web address].     Regarding current visitor information:    Please visit the following website for the detailed and up-to-date visitor screening and restriction policy at [email or web address].    Hibiclens Bathing  Hibiclens is a body soap that is used before surgery protect you from getting an infection after surgery   Hibiclens comes in a large blue bottle and can be found in most pharmacies in the First Aid supplies  Shower with this daily for FIVE consecutive days before surgery, using the entire bottle over the five days.  The last shower should be the night before surgery.   Steps to bathing with Hibiclens  Do not use Hibiclens on your hair, face or private areas  Wash your hair and face as normal with your usual cleansers  Rinse well  Using a clean wet washcloth apply enough Hibiclens to cover your body. Wash from the neck down avoiding the genital areas and concentrating on the surgical area  Rinse well  Dry yourself with a clean, dry towel  Do not use any powders, creams, lotions or sprays on your body as these attract bacteria  Deodorant and facial creams are acceptable.   (Laundering/cleaning: Chlorhexidine gluconate skin cleansers will cause stains if used with chlorine releasing products. Rinse completely and use only non-chlorine detergents.)    For Office Use Only:    Medical Clearances Needed: PCP  PA: CHRISTINA  CPT Codes:

## 2025-04-18 NOTE — PROGRESS NOTES
Established patient:  Reason for follow up:   Neck and L arm pain   Pt taking gabapentin with no significant relief     Numeric Rating Scale:       Pain at Present: 5/10

## 2025-04-18 NOTE — H&P
Neurosurgery Clinic Visit  2025    Charlotte Avalos PCP:  Aidan Bourgeois MD    1970 MRN [id number]     HISTORY OF PRESENT ILLNESS:  Charlotte Avalos is a(n) 54 year old female presents for follow-up  She increased the gabapentin  She is having pain down the arm  She is not interested in injections  She would like to proceed with further intervention  She has done physical therapy back in         PHYSICAL EXAMINATION:  Vital Signs:  /84   Pulse 87   Ht 64\"   Wt 185 lb (83.9 kg)   SpO2 98%   BMI 31.76 kg/m²   Awake alert, x 3  Follows commands x 4      REVIEW OF STUDIES:    MRI reviewed      ASSESSMENT and PLAN:  54-year-old female with left-sided C8 radiculopathy  We long conversation between options  She would like to proceed with surgery  I would recommend a cervical foraminotomies  Discussed a left C7/T1 foraminotomies  Risk benefits were discussed in detail which include but not limited to bleeding, infection, CSF leak, temporary permanent neurologic deficit, not helping her pain, need for future intervention  All questions were answered  She would like to proceed  Will get PCP clearance  Reviewed films in detail  Patient appreciative        Time spent on counseling/coordination of care:  20 minutes    Total time spent with patient:  20 minutes      Ramon Bhakta MD   [facility]  2025  12:21 PM   Dictated but not proofread

## 2025-04-21 NOTE — TELEPHONE ENCOUNTER
You are scheduled for Left C7-T1 Foraminotomy on 5.14.25 with Dr. Bhakta at [facility].     PCP clearance is needed.  We have faxed a request for pre-op clearance to your PCP Dr Keith Bourgeois. Please contact their office for appointment.      You will need  pre operative labs which will include MRSA/MSSA testing.  You will need to contact the Pre-admission department at [phone] to schedule an appointment for your labs.     The Pre-Admission nurse will review your health history and give you information from the hospital. The nurse will also get you scheduled for all your pre-op testing required for your surgery.     Do not take any blood thinning medications such as over the counter non-steroidal antiinflammatories (advil, aleve, ibuprofen etc.), herbal supplements (garlic,tumeric etc.), vitamin E, fish oil or krill oil for at least 7 days prior to surgery. You may only take Tylenol, Extra Strength Tylenol, Arthritis Tylenol, or prescription Norco or Tramadol for pain if something is needed.    You should have nothing to eat or drink after 11:00pm the night prior to surgery except for the following:    Do drink 12 ounces of regular Gatorade (NOT RED) 12 hours and 4 hours prior to your scheduled surgery time, Do not drink any other liquids (including water) before your surgery. Do not chew gum or eat candies before surgery.  Take 1000 mg of Tylenol (Acetaminophen) 4 hours before your scheduled surgery time, take this with your scheduled Gatorade.    In order to prevent infection, you will need to purchase Hibiclens soap and use it after your regular body soap for 5 days prior to your procedure.  The last shower should be the night before surgery.  This soap can be found at any pharmacy in the first aid section. See detailed instructions below.      Our office will get prior authorization for surgery through your insurance.     Surgery is usually scheduled as a 1 day admission.  This is an estimate and  varies from person to person.  Ultimately, the surgeon will determine when you are ready to be discharged.    The hospital will contact you 1-2 days before surgery with your arrival time.     If you were on blood thinners (such as Coumadin, Pradaxa, Xarelto, etc) prior to surgery that we had you stop for surgery, please make sure you get instructions about when to resume the medication before you are discharged from the hospital.    Post operative appointments to be made 2 weeks before surgery as well as 2 and 6 weeks after surgery.      Your 2 week pre-op surgical telehealth appointment is on 4.30.25 at 9:45 am with the Spine Navigator RN    Your 2 week post-op appointment is on 5.27.25 at 11:30 am with SARWAT [place]    Your 6 week post-op appointment is on 6.26.25 at 11:30 am with Dr Bhakta        Restrictions for after surgery:  Lifting restriction of 10 pounds or less.  No bending or twisting.  No prolonged sitting or standing.  Driving is restricted for the first 1-2 weeks (this will vary from surgeon to surgeon.)  Fusion patients may require bracing such as TLSO or LSO brace. Braces are custom made to fit the patient.  Patient's are to keep their incisions covered for the first 3 days post surgery. After that they may leave the incision open to air. It is better for the healing process if the incision is left open to air.   May shower after the third day.  Do not scrub the incision and avoid any lotions or creams to the incision.    Please make sure to arrive at least 15 minutes prior to your scheduled appointment in order to get checked in and roomed in a timely manner.  Depending on provider availability, late patients may be required to reschedule.  REFILLS:  After surgery, please remember that we do have a 48 hour refill policy that does not include weekends, please make sure to request your medications in a timely manner so that you do not go days without medication.  *Refills should be requested  through your pharmacy or through the refill request in [facility] (log in, go to medications, then select refill request).  [employer] MESSAGING:  Please remember that our office is closed during the weekend and no one is available for [facility] messages. If you have an urgent or emergent matter please go to a walk-in center or the emergency room. Also please remember your [employer] messages are part of your legal medical record and should not be utilized as a personal email with our providers as it is visible to all [facility] employees. Also, Since [employer] messages are not for emergent matters it may be several days before there is a response to your message.  FMLA/PAPERWORK COMPLETED BY OUR MEDICAL FORMS DEPARTMENT:  If you require FMLA paperwork for your surgery, please make sure to either Drop it off or have it faxed to our office at [phone]. Make sure it has your NAME, , and has your signature. You will need to have a Release of Information on file. To facilitate this process we ask that you requested it at the  on your way out and sign it. Without a signed NASEEM or signature on the form we will not be able to fax it and this will cause a delay with your forms. Fees charged for forms are $25 for initial submission and $15 for recertifications. If you have questions on the status of your forms please call the forms department at [phone].  **We do have a 3 week policy for all forms and paperwork, please make sure to allow plenty of time for completion. Same day paperwork will not be completed. **    Spine Navigator  You will have a 30 minute telehealth visit with our spine navigator approximately 2 weeks before your surgery. This visit is NOT optional. This appointment will get booked when you schedule your spinal surgery.  When speaking with Pre-admissions you will be told about a spine class as it relates to your surgery, this is an OPTIONAL class. The same or similar information will be  discussed with you during your telehealth appointment with the spine navigator (Spine Navigator appointment is not optional). However, if you would like to have this information repeated to you or if you would feel more comfortable with additional information, you can elect to schedule this class during your pre-admissions phone call.  The Pre-op Spine Surgery Class is held at [facility] most Wednesdays from 3:30-4:30 pm. Call Pre-admission Testing (PAT) to register at:  [phone]. Please park in the [facility] Parking garage, check in at registration and meet in the [facility] lobby for your escort to class on the Ortho Spine unit. If unable to attend, but would like additional information, the class is available online at [email or web address].     Regarding current visitor information:    Please visit the following website for the detailed and up-to-date visitor screening and restriction policy at [email or web address] [email or web address].    Hibiclens Bathing  Hibiclens is a body soap that is used before surgery protect you from getting an infection after surgery   Hibiclens comes in a large blue bottle and can be found in most pharmacies in the First Aid supplies  Shower with this daily for FIVE consecutive days before surgery, using the entire bottle over the five days.  The last shower should be the night before surgery.   Steps to bathing with Hibiclens  Do not use Hibiclens on your hair, face or private areas  Wash your hair and face as normal with your usual cleansers  Rinse well  Using a clean wet washcloth apply enough Hibiclens to cover your body. Wash from the neck down avoiding the genital areas and concentrating on the surgical area  Rinse well  Dry yourself with a clean, dry towel  Do not use any powders, creams, lotions or sprays on your body as these attract bacteria  Deodorant and facial creams are acceptable.   (Laundering/cleaning: Chlorhexidine gluconate skin  cleansers will cause stains if used with chlorine releasing products. Rinse completely and use only non-chlorine detergents.)    For Office Use Only:    Medical Clearances Needed: PCP  PA: CHRISTINA  CPT Codes:

## 2025-04-22 ENCOUNTER — TELEPHONE (OUTPATIENT)
Dept: FAMILY MEDICINE CLINIC | Facility: CLINIC | Age: 55
End: 2025-04-22

## 2025-04-22 NOTE — TELEPHONE ENCOUNTER
Letter received from surgery dept.  Scheduled 5/14/25 with Dr Echols for Left C7-T1 Foraminotomy     Needs H&P and labs   Form in Dr Keith HOGAN preop folder    Routed to  to schedule

## 2025-04-22 NOTE — TELEPHONE ENCOUNTER
Patient is scheduled for Left C7-T1 Foraminotomy on 5.14.25 with Dr. Bhakta at [facility].      NA pre-op apt scheduled (if sx is more than 30 days from last apt)  Y pre-op apt scheduled with RN spine navigator  Y Surgical instructions reviewed by nursing staff with patient  Y  form completed  Y Surgery order signed   Y Placed sx on surgery sheet  Y Placed on outlook calendar  [facility] sent to patient with sx instructions  Y Faxed pre-op clearance request to PCP ESTIVEN MARTINEZ Faxed letter to prescribing provider requesting anticoagulants be held for surgery  NA E-mail sent to [employer]  Y Post-op appointments made  Y PA NEEDED. Routed to PA team to initiate.  Y Post-Op outreach pt reminder placed.   Y Entire Neurosurgery Checklist Completed    Clearances: PCP  PA:CHRISTINA  CPT Codes: 05857

## 2025-04-23 NOTE — TELEPHONE ENCOUNTER
SCHEDULED APT     Future Appointments   Date Time Provider Department Center   4/30/2025  9:45 AM SPINE NAVIGATOR Spine Center None   4/30/2025  1:00 PM Aidan Van MD EMGYK [facility] [place]   5/10/2025  7:00 AM PF CT RM1 PF CT [place]   5/27/2025 11:30 AM Jessica Enrique APRN ENINAPER2 [facility] [place]   6/26/2025 11:30 AM Ramon Bhakta MD ENINAPER2 [facility] [place]

## 2025-04-23 NOTE — TELEPHONE ENCOUNTER
Prior Authorization for Outpatient surgery initiated with [employer] online.  Requesting coverage for: Left C7-T1 Foraminotomy   Date of Service: 05/14/25   Inpatient days requested:Outpatient  CPT codes: 25521    Request for surgery pending review, pending reference #[id number].

## 2025-04-25 RX ORDER — BUPROPION HYDROCHLORIDE 150 MG/1
150 TABLET ORAL DAILY
Qty: 90 TABLET | Refills: 0 | Status: SHIPPED | OUTPATIENT
Start: 2025-04-25

## 2025-04-30 ENCOUNTER — OFFICE VISIT (OUTPATIENT)
Dept: FAMILY MEDICINE CLINIC | Facility: CLINIC | Age: 55
End: 2025-04-30
Payer: COMMERCIAL

## 2025-04-30 ENCOUNTER — HOSPITAL ENCOUNTER (OUTPATIENT)
Dept: GENERAL RADIOLOGY | Age: 55
Discharge: HOME | End: 2025-04-30
Attending: FAMILY MEDICINE
Payer: COMMERCIAL

## 2025-04-30 ENCOUNTER — NURSE ONLY (OUTPATIENT)
Age: 55
End: 2025-04-30
Payer: COMMERCIAL

## 2025-04-30 VITALS
HEART RATE: 76 BPM | TEMPERATURE: 97 F | OXYGEN SATURATION: 99 % | BODY MASS INDEX: 35.68 KG/M2 | WEIGHT: 209 LBS | DIASTOLIC BLOOD PRESSURE: 88 MMHG | HEIGHT: 64 IN | SYSTOLIC BLOOD PRESSURE: 138 MMHG | RESPIRATION RATE: 16 BRPM

## 2025-04-30 DIAGNOSIS — K21.9 CHRONIC GERD: ICD-10-CM

## 2025-04-30 DIAGNOSIS — Z01.818 PRE-OP EVALUATION: Primary | ICD-10-CM

## 2025-04-30 DIAGNOSIS — F17.200 SMOKER: ICD-10-CM

## 2025-04-30 DIAGNOSIS — Z71.9 ENCOUNTER FOR EDUCATION: Primary | ICD-10-CM

## 2025-04-30 DIAGNOSIS — I10 ESSENTIAL HYPERTENSION: ICD-10-CM

## 2025-04-30 DIAGNOSIS — Z01.818 PRE-OP EVALUATION: ICD-10-CM

## 2025-04-30 DIAGNOSIS — M54.2 NECK PAIN: ICD-10-CM

## 2025-04-30 PROCEDURE — 71046 X-RAY EXAM CHEST 2 VIEWS: CPT | Performed by: FAMILY MEDICINE

## 2025-04-30 PROCEDURE — 99214 OFFICE O/P EST MOD 30 MIN: CPT | Performed by: FAMILY MEDICINE

## 2025-04-30 RX ORDER — VARENICLINE TARTRATE 0.5 (11)-1
1 KIT ORAL AS DIRECTED
Qty: 1 EACH | Refills: 0 | Status: SHIPPED | OUTPATIENT
Start: 2025-04-30

## 2025-04-30 RX ORDER — BUPROPION HYDROCHLORIDE 300 MG/1
300 TABLET ORAL DAILY
Qty: 90 TABLET | Refills: 1 | Status: SHIPPED | OUTPATIENT
Start: 2025-04-30

## 2025-04-30 NOTE — PROGRESS NOTES
RN Spine Navigator Education for Charlotte Avalos     If you have received instructions from your surgeon that are different from those listed below, please follow your physician's instructions.    You are scheduled for a Cervical decompression with Dr. Bhakta on 5/14/25.      Patient Surgical Goals: Charlotte suffers from sharp pain in the cervical area that radiates down into her shoulders and arms. Her symptoms are bilaterally but they do seem to be worse on the left side. She also has tingling in some of her left fingers. These symptoms cause difficulty in moving her shoulders and moving her arms. The symptoms are aggravated with movement such as prolonged sitting, prolonged standing, driving, and sleeping. Because of this she struggles with her day-to-day activities to include showering and dressing for the day. Her goals for surgery are pain relief, increased mobility, and improved quality of life. Minimally, she is hoping this surgery will allow her to have returned function of her arms.     Before Your Surgery    Choose a Care Partner  Patient attended spine navigator visit independently.  Care partner is her  Salvatore. Your care partner(s) should be able to provide transportation to and from the hospital. They should be able to help at home for the first week after discharge, including helping you with meals, medication, and dressing changes.    Clearance Before Surgery 4/30/25  You will need to see your primary care provider within 30 days before surgery. Please make sure this appointment is at least a week before surgery as more testing or doctor visits may be ordered. Presurgical testing may include labs, nasal swab, imaging, EKG.   Make sure that you complete all preadmission testing so that surgery does not get delayed.    Home Environment  Assessed home status: home has stairs, bedroom and bathroom are on first floor, and patient has pets. Suggested arrangements for pet care and help at home.  It is recommended  that you prepare your home by putting clean sheets on your bed, freezing meals, and putting frequently used items at waist level.   Prevent falls by removing items that could cause you to trip, adding nightlights and adding a nonskid mat in shower.       Tobacco, Nicotine and Marijuana Use   Educated on the importance of nicotine cessation and Smoking any amount or use of nicotine products can delay or prevent healing. Do not use nicotine products for at least two weeks after surgery    Nicotine and alcohol use can significantly affect your healing time as they impair circulation depriving the injured area of oxygen and nutrients.  Nicotine use puts you at higher risk for cardiovascular complications including heart attack and blood clots. Smoking increases the risk of respiratory problems and pulmonary complications such as pneumonia.      Medications to Stop   For 7-10 days before surgery do not take any blood thinning medications. This includes non-steroidal anti-inflammatories or NSAIDs (Advil, Aleve, Motrin, ibuprofen, naproxen, meloxicam, diclofenac, celecoxib, etc.), aspirin (unless told otherwise by your cardiologist or surgeon), herbal supplements and vitamins (garlic, turmeric, vitamin E, fish oil or krill oil, etc.). You may only take Tylenol or prescribed narcotic medication if needed for pain.   Other medications to stop include: none    Leading Up to Day of Surgery  Five days before surgery wash with Hibiclens soap after your regular body soap every day. Do not put use Hibiclens on your face, hair or privates. Your last shower should be the night before surgery.  One-two business days before surgery, the preadmission testing staff will call you and let you know what time to arrive, where to park, when to take your Tylenol and Gatorade, and will provide any additional instructions.   After 11pm the day before surgery, do not eat or drink anything (including water, gum, or candies) except for Tylenol and  Gatorade.   Drink 12 ounces of regular Gatorade (NOT RED) 12 hours prior to your scheduled surgery time. Drink your second 12 ounces of regular Gatorade and take 1000 mg of Tylenol (acetaminophen) 4 hours before your scheduled surgery time.     Items to Bring to the Hospital   Bring insurance card, ID, advanced directive, or medical power of  paperwork, loose fitting clothing, shoes with a back that can accommodate swollen feet, long phone charging cord.  Do not bring jewelry, valuables, or medications.   If you take an uncommon medication that the hospital may not have, it must be brought to the hospital in the original container, and you must notify the nurse of this medication.     In the Hospital     Operative Day and Hospital Stay  In the preoperative area, you will change into a gown, have an IV placed in your hand or arm by the nurse, and sign any consent paperwork that is needed for your procedure. You will speak to the surgeon and anesthesiologist. It is important to tell the doctors and nurses if you have had any significant side effects from pain medications or anesthesia such as a rash or severe nausea.    In the operating room, the anesthesiologist will attach monitors, give you oxygen through a mask, and give you medicine through the IV to fall asleep. After you are sleeping, the breathing tube will be placed. The surgeon may use additional nerve monitoring during your surgery. This is to make sure that the muscles and nerves in your arms and legs are working normally as he operates. The equipment will be hooked up and removed while you are asleep. You will wake up on the hospital bed.     During the surgery, your care partner can sit in the surgical waiting area. There are TV screens in that area that keep them informed of your progress. If the procedure is at [facility], there is a person who can speak to them about your surgical progress.     In the recovery room, monitors will be attached that  check your heart rate, blood pressure and oxygen levels. While you may not remember this part, a nurse is with you and constantly checking on your condition. Medications for pain and nausea will be given if you need them. You may have a bess catheter to empty your bladder or a drain at your surgical site. Your family is not allowed in the recovery room. When you are ready to leave the recovery room, you will be transported on your bed to the inpatient unit accompanied by your family once a room is available.  On the inpatient unit, a team of doctors and advanced practice providers will manage your care. The spine care nurses will check your blood pressure, temperature, heartbeat, breathing, stomach sounds and your incision. They may assess the strength and sensation in your arms and legs. Medications that are given in the hospital include antibiotics, IV fluids, pain medications, muscle relaxers, stool softeners, and your home medications. You may get blood drawn and another x-ray. Physical and occupational therapists may come to your room to teach you how to move around safely after surgery.     Post Op Plan   The average length of hospital stay is one to three days. A  may visit you to help arrange extra care for you once you leave the hospital. Occasionally, it is recommended that a home health nurse or therapist visit you in your home for a short time. The best place to recover is your own home, but if you need more assistance than home health and your care partner can provide, the  will help you and your family choose other facilities to help you recover your strength.    Preventing surgical complications  It is important to follow all instructions before and after surgery to decrease the risks of surgery and prevent complications.     Blood clots: walk, wear leg compression devices in the hospital, and do ankle pumps at home  Infection: wash with Hibiclens before surgery, wash your  hands, don't touch your incision  Pneumonia: take deep breaths and cough and use the breathing exercise (incentive spirometer)   Nausea/vomiting: start with liquids and small meals and do not take pills on an empty stomach  Constipation: drink water and walk frequently  If you are prone to constipation, start an over the counter stool softener while taking your narcotic medication.  After surgery if you have gone 3-4 days without a bowel movement, we recommend you use either a suppository or an enema (follow the packaging instructions for use). The longer you stay constipated the more painful and difficult it will be to relieve your constipation.      Tell your nurse if you are experiencing pain, nausea, vomiting or constipation as they have medications to help treat these.      At home     Understanding Pain After Surgery  We will do our best to manage your pain after surgery, but it is not possible to be completely pain-free. There will be operative pain in your back or neck. Pain in the arms or legs may be one of the first things to improve. Numbness, weakness, and tingling should improve over time. However, there can be a temporary increase in symptoms in the first few days due to inflammation from surgery.   Pain medications will be prescribed to take home at discharge. The goal of pain medicine after surgery is to make your pain tolerable, not to make you pain free. We encourage you to use the medication prescribed to you after your surgery, but please take the lowest possible dose to manage your pain. Taking high doses of narcotics can cause side effects. Transition to plain Tylenol when your pain improves. At [facility], your prescriptions can be filled by our pharmacy and brought to you bedside. You may get more continuous pain relief by alternating between medications if you have multiple instead of taking them at the same time. Write down when you have taken a medication as it may be difficult to  remember after a few doses.    Post operative medication   Tylenol (acetaminophen): take for pain. Do not take more than 3000 mg - 4000 mg in 24 hours because it can damage your liver.   Narcotics: take for moderate to severe pain. Do not drink alcohol or drive while taking narcotics. Some narcotic medications (Norco, Tylenol #3, Percocet, Vicodin) contain Tylenol (acetaminophen). Make sure to not exceed the maximum dose if you are taking additional Tylenol with these medications.  Muscle relaxers: take for muscle cramping. These can cause drowsiness.    NSAIDS (Advil, Aleve, Motrin, ibuprofen, naproxen, meloxicam, diclofenac, celecoxib, etc.) or aspirin: Do not take these unless your physician says it is ok. For a fusion, it may be several months before you can take NSAIDS.  Stool softeners: take to prevent constipation while you are taking narcotic medications. You can get these over the counter at the pharmacy. You may use laxatives, which are stronger, if needed.    If you believe you will need more of medication your surgeon has prescribed to you, request a refill through your pharmacy or through the refill request in "[employer] (log in, go to medications, then select refill request) at least two business days before you run out of medication.     Nonpharmacologic pain management   You may use ice on your incision for 20 minutes every hour to help with pain and swelling. Do not place ice directly on your skin. You can use heat to sooth your muscles but avoid placing heat directly on your incision. Make frequent position changes. You can do gentle stretching while avoiding significant bending or twisting. Use deep breathing techniques and distractions such as TV, music, reading, or games.     Movement restrictions  After surgery, no bending or twisting. Do not lift anything over 10lbs (about the weight of a gallon of milk) or lift anything over your shoulders. Avoid pulling or pushing. You may use stairs while  holding the handrail.  It is ok to ride in the car but refrain from driving or traveling long distances until cleared to do so by your surgeon. You may not drive while taking narcotics or muscle relaxants.      Post Op Exercise   Walk frequently. Start with walking short distances and increase as you start to feel better. Do ankle pumps (bending at your ankles, bring your feet towards your head then point them towards the ground) 15-20 times every hour when awake to help prevent blood clots.     Your surgeon will let you know at your post operative appointment if you are ready to decrease your movement restrictions and increase your exercise. If you have questions in between appointments about lessening your restrictions, please contact the office.     You and your doctor will discuss how you are feeling as you heal and decide if outpatient physical therapy or a medical fitness referral is needed.    Caring for your incision  Always wash your hands before touching your incision. Your incision will be closed with sutures under the skin and skin glue or Steri-Strips (thin white bandages) on top of the skin. Do not attempt to peal off Skin glue/Steri-Strips as they will come off on their own. If the incision is closed with sutures or staples on top of the skin, they will be removed at a post op appointment. The incision may be covered with a gauze dressing that can come off after three days. Once the original gauze dressing is removed, we prefer that you leave your incision open to air (without a gauze dressing). If the incision has drainage or is rubbing against your clothes or brace, you may place gauze and medical tape over it. Change the gauze and medical tape daily. Look at your incision daily to check for signs of infection.   You can shower three days after your surgery or sooner if your surgeon allows. We recommend the care partner be present during the first shower for safety. Let soapy water run over the  incision, but do not scrub it or spray it directly. Gently pat it dry after with a clean towel. Do not apply any creams or lotions to the incision. Do not soak in a tub, pool, or any body of water until your incision is fully healed.    Signs of Infection   Check your incision daily for swelling, redness, drainage, pus, bad smell, or opening of the incision.     When to Call for Assistance  Call the Neurosurgery Office ([phone] Option #2) if you experience signs of infection, opening of the incision, continuous nausea or vomiting, poor pain control despite using the pain medication as directed, a sudden increase in pain, temperature over 101F, or with any concerns, unanswered questions, or new problems, such as new numbness/weakness/tingling.  Call 911 or go to the nearest emergency room if you experience chest pain, difficulty breathing, loss of bowel or bladder control, extreme drowsiness, or any other life-threatening situation.     Please remember that the office of Dr. Bhakta is closed on the weekends, holidays, and there is no one in the office from 4:30 pm to 8 am. If you have an urgent matter that needs attention you will need to go to the emergency room or immediate care for assistance. If it is an urgent matter during work hours please make sure to call the office of Dr. Bhakta as [facility] messages are not meant for Urgent/Emergent situations. MyChart messages can take 5-7 days for a response.      Follow-up Plan   Appointments with Dr. Bhakta or Roxana at 2 and 6 weeks  Your 2 week post-op appointment is on 5.27.25 at 11:30 am with SARWAT [place]  Your 6 week post-op appointment is on 6.26.25 at 11:30 am with Dr Bhakta    Questions: Charlotte was very pleasant to speak with this morning, she acknowledged all information provided and had no additional questions at this time.     If you would like clarification regarding anything discussed today, you can call your surgeons office and speak with  one of the nursing staff. If you feel you require and additional appointment to review information again you can call the RN Spine Navigator at [phone] #4 to schedule. If you have questions about your upcoming surgery, changes in your symptoms, or if you need to refill your medications please do not call the RN spine navigator, you will need to speak with someone from your surgeons office.     Spine navigator spent 32 minutes conducting a virtual visit to provide education. Thank you for letting the RN Spine Navigator participate in your care.

## 2025-04-30 NOTE — H&P
HPI:   Charlotte Avalos is a 54 year old female who presents for a pre-operative physical exam. Patient is to have L Cc7- T1 foramninotomy, to be done by Dr. Tinsley  at [facility]  on 5/14/25.      Pt complains of nekc pain.    Revised Cardiac Risk Index (modified Weldon Index):   High-risk surgical procedure (intraperitoneal, intrathoracic, suprainguinal vascular) No   History of MI or positive stress test, current chest pain secondary to myocardiac ischemia, current nitrate therapy, or EKG with pathologic Q wave No   History of CHF, pulmonary edema, PND, current rales, S3 gallop, chest xray with pulmonary vascular redistribution No   History of CVA/TIA No   Preoperative treatment with insulin No   Preoperative creatinine > 2.0 No   Score 0     Risk of major cardiac complication: low     Patient reports no:  Chest pain  Chest discomfort  Jaw pain  Jaw discomfort  Arm pain   Arm discomfort  Shortness of breath   Dyspnea on exertion  Paroxysmal nocturnal dyspnea  Orthopnea  Palpations  Edema    Previous hx of cardiac or vascular issues are htn    Current Medications[1]   Allergies: Allergies[2]   Past Medical History[3]   Past Surgical History[4]   Family History[5]   Social History: Short Social Hx on File[6]       REVIEW OF SYSTEMS:   GENERAL: feels well otherwise  SKIN: denies any unusual skin lesions  EYES: denies blurred vision or double vision  HEENT: denies URI symptoms  LUNGS: see HPI  CARDIOVASCULAR: see HPI  GI: denies diarrhea  : no urinary symptoms  MUSCULOSKELETAL: neck pain  PSYCHE: denies depression or anxiety  HEMATOLOGIC: denies hx of anemia  ENDOCRINE: denies thyroid history  ALL/ASTHMA: denies hx of allergy or asthma    EXAM:   BP (!) 146/98 (BP Location: Right arm, Patient Position: Sitting, Cuff Size: adult)   Pulse 76   Temp 96.6 °F (35.9 °C)   Resp 16   Ht 5' 4\" (1.626 m)   Wt 209 lb (94.8 kg)   LMP  (LMP Unknown)   SpO2 99%   BMI 35.87 kg/m²   GENERAL: well developed, well  nourished, in no apparent distress  SKIN: no rashes, no suspicious lesions  HEENT: atraumatic, normocephalic, ears and throat are clear  EYES: PERRL, EOMI, normal conjunctiva, anicteric  NECK: supple, no adenopathy, no bruits  LUNGS: clear to auscultation  CARDIO: RRR without murmur  GI: good BS's, no masses, HSM or tenderness  : deferred  MUSCULOSKELETAL: limited neck rom  EXTREMITIES: no cyanosis, clubbing or edema  NEURO: A &O x 3, CN intact, motor and sensory are grossly intact    ASSESSMENT AND PLAN:     Charlotte Avalos is a 54 year old female who presents for a pre-operative physical exam. Patient is to have C7-T1 foraminectomy, to be done by Dr. Bhakta at  on 5/14/25. Pt has the following conditions: neck pain, htn, gert. Pt has no significant history of cardiac or pulmonary conditions. Patient is cleared for surgery with the risks of the procedure and anesthesia as discussed with those specific specialists. To see hospital for pre surgical testing, will order cxr as requested    This consult was sent back the referring physician, Dr. Bhakta.             [1]   Current Outpatient Medications   Medication Sig Dispense Refill    buPROPion  MG Oral Tablet 24 Hr Take 1 tablet (150 mg total) by mouth daily. 90 tablet 0    Omega-3 Fatty Acids (FISH OIL OR) Take by mouth. (Patient not taking: Reported on 4/30/2025)      cetirizine 1 MG/ML Oral Solution Take 5 mL (5 mg total) by mouth in the morning.      triamcinolone 0.1 % External Cream Apply ot affected skin 2 times a day for 14 days max 60 g 3    gabapentin 300 MG Oral Cap Take 1 capsule (300 mg total) by mouth 3 (three) times daily. (Patient taking differently: Take 1 capsule (300 mg total) by mouth in the morning and 1 capsule (300 mg total) in the evening and 1 capsule (300 mg total) before bedtime. 300mg morning and afternoon, 600mg at bedtime.) 90 capsule 2    pantoprazole 40 MG Oral Tab EC Take 1 tablet (40 mg total) by mouth in the morning.       Halobetasol Propionate 0.05 % External Ointment Apply topically in the morning and before bedtime. (Patient not taking: Reported on 4/30/2025)     [2] No Known Allergies  [3]   Past Medical History:   Esophageal reflux    Essential hypertension    PONV (postoperative nausea and vomiting)   [4]   Past Surgical History:  Procedure Laterality Date    Back surgery  01/01/2013    cer fusion revision     Other surgical history  2003    C5-C6 Fusion    Other surgical history  2007    C6-C7 FUSION    Spinal fusion  2002    C5 -c 6 FUSION   [5]   Family History  Problem Relation Age of Onset    Dementia Mother     Diabetes Father     Diabetes Paternal Grandmother     Diabetes Paternal Grandfather    [6]   Social History  Socioeconomic History    Marital status:    Tobacco Use    Smoking status: Some Days     Current packs/day: 0.50     Types: Cigarettes     Passive exposure: Never    Smokeless tobacco: Never    Tobacco comments:     Will start wellbutrin   Vaping Use    Vaping status: Never Used   Substance and Sexual Activity    Alcohol use: Yes     Comment: socially    Drug use: Not Currently     Types: Cannabis     Comment: gummies occ - has not used since 2/2025   Other Topics Concern    Caffeine Concern No    Exercise No

## 2025-05-01 ENCOUNTER — LABORATORY ENCOUNTER (OUTPATIENT)
Dept: LAB | Age: 55
End: 2025-05-01
Attending: NEUROLOGICAL SURGERY
Payer: COMMERCIAL

## 2025-05-01 DIAGNOSIS — Z01.818 PRE-OP TESTING: ICD-10-CM

## 2025-05-01 LAB
ANTIBODY SCREEN: NEGATIVE
APTT PPP: 27.3 SECONDS (ref 23–36)
BASOPHILS # BLD AUTO: 0.05 X10(3) UL (ref 0–0.2)
BASOPHILS NFR BLD AUTO: 1 %
EOSINOPHIL # BLD AUTO: 0.3 X10(3) UL (ref 0–0.7)
EOSINOPHIL NFR BLD AUTO: 5.9 %
ERYTHROCYTE [DISTWIDTH] IN BLOOD BY AUTOMATED COUNT: 13.3 %
HCT VFR BLD AUTO: 43.8 % (ref 35–48)
HGB BLD-MCNC: 14.6 G/DL (ref 12–16)
IMM GRANULOCYTES # BLD AUTO: 0.01 X10(3) UL (ref 0–1)
IMM GRANULOCYTES NFR BLD: 0.2 %
INR BLD: 0.94 (ref 0.8–1.2)
LYMPHOCYTES # BLD AUTO: 1.64 X10(3) UL (ref 1–4)
LYMPHOCYTES NFR BLD AUTO: 32.3 %
MCH RBC QN AUTO: 30.9 PG (ref 26–34)
MCHC RBC AUTO-ENTMCNC: 33.3 G/DL (ref 31–37)
MCV RBC AUTO: 92.6 FL (ref 80–100)
MONOCYTES # BLD AUTO: 0.36 X10(3) UL (ref 0.1–1)
MONOCYTES NFR BLD AUTO: 7.1 %
NEUTROPHILS # BLD AUTO: 2.71 X10 (3) UL (ref 1.5–7.7)
NEUTROPHILS # BLD AUTO: 2.71 X10(3) UL (ref 1.5–7.7)
NEUTROPHILS NFR BLD AUTO: 53.5 %
PLATELET # BLD AUTO: 227 10(3)UL (ref 150–450)
PROTHROMBIN TIME: 12.6 SECONDS (ref 11.6–14.8)
RBC # BLD AUTO: 4.73 X10(6)UL (ref 3.8–5.3)
RH BLOOD TYPE: POSITIVE
WBC # BLD AUTO: 5.1 X10(3) UL (ref 4–11)

## 2025-05-01 PROCEDURE — 85730 THROMBOPLASTIN TIME PARTIAL: CPT

## 2025-05-01 PROCEDURE — 86900 BLOOD TYPING SEROLOGIC ABO: CPT

## 2025-05-01 PROCEDURE — 87081 CULTURE SCREEN ONLY: CPT

## 2025-05-01 PROCEDURE — 85025 COMPLETE CBC W/AUTO DIFF WBC: CPT

## 2025-05-01 PROCEDURE — 86901 BLOOD TYPING SEROLOGIC RH(D): CPT

## 2025-05-01 PROCEDURE — 86850 RBC ANTIBODY SCREEN: CPT

## 2025-05-01 PROCEDURE — 36415 COLL VENOUS BLD VENIPUNCTURE: CPT

## 2025-05-01 PROCEDURE — 85610 PROTHROMBIN TIME: CPT

## 2025-05-01 NOTE — TELEPHONE ENCOUNTER
Per PCP, \"Patient is cleared for surgery with the risks of the procedure and anesthesia as discussed with those specific specialists\"    PA pending

## 2025-05-06 NOTE — TELEPHONE ENCOUNTER
Called Kalli at [phone] to check the status of surgery.  Spoke to Raquel.  Case is still pending waiting for clinicals told her [employer] said if clinicals were needed someone would reach out.  No one has asked for clinicals.      Faxed to [phone], confirmation received.

## 2025-05-07 NOTE — TELEPHONE ENCOUNTER
Received notification of need for peer to peer. Called [employer] to schedule an appointment.     Scheduled appointment for peer to peer for 5.8.25 between 0900 and 1100 and 5.9.25 between 0900 and 1100.     Dr. Bhakta to speak with Dr. Colby PHELAN and callback number given:  [phone].     P2P appointment placed on [facility] calendar. Message given to Dr. Bhakta.     Time on call: 35 minutes

## 2025-05-07 NOTE — TELEPHONE ENCOUNTER
Procedure Name: Left C7-T1 Foraminotomy   CPT Code(s): 77234    The procedure has been DENIED.    Plan/3rd party: [employer]off[employer]  Case/Reference #: [id number]  P2P Phone #: [phone] option 4  Number of days to complete P2P: 14  Reason for denial: This service isn't medically necessary under the terms of the member's plan.  The coverage requirements are herniated discs or other causes of spinal cord or nerve root compression, and all of the following:  (1) all other reasonable sources of pain and/or neurological deficit have been ruled out; (2) presence of signs or symptoms of neural compression (radiculopathy, myelopathy) associated with the levels being treated; (3) imaging studies (such as CT or MRI) performed in the last year confirm spinal stenosis (recess, foraminal, cental stenosis) graded as moderate, moderate to severe or severe (not mild  or mild to moderate) or spinal cord or nerve root compression consistent with the clinical findings; (4) the member has failed at least six weeks of conservative therapy within the past year, including patient education, active modalities, in-person physical therapy and medications, unless there is evidence of significant neurologic compromise or other contraindications for conservative management; and (5) activities of daily living are limited by the member's signs or symptoms.  The member doesn't meet all of these requirements.      Routed to [facility] [place]

## 2025-05-08 NOTE — TELEPHONE ENCOUNTER
Received update from Dr. Bhakta that after the peer to peer, the surgery has not been approved due to the written radiology report that did not mention foraminal stenosis.     Per Dr. Bhakta, he will call the radiologist to discuss.   Fax number to send addended imaging report:  [phone].

## 2025-05-09 NOTE — TELEPHONE ENCOUNTER
Noted that Dr. Mary Ellen Millard addended his MRI cervical spine written report.     Report from 9.19.24 copied and faxed to Dr. Nikhil CAPONE At [facility] to fax number:    [phone]    Confirmation fax received. Routed to PA team.

## 2025-05-09 NOTE — TELEPHONE ENCOUNTER
Prior authorization request completed for:  Left C7-T1 Foraminotomy    Authorization #[id number]  Authorization dates: 05/14/25 (05/09/25-11/09/25)  CPT codes approved: 64920  Number of visits/dates of service approved: Outpatient  Physician: Livia  Location: [place]

## 2025-05-14 ENCOUNTER — ANESTHESIA (OUTPATIENT)
Dept: SURGERY | Facility: HOSPITAL | Age: 55
End: 2025-05-14
Payer: COMMERCIAL

## 2025-05-14 ENCOUNTER — APPOINTMENT (OUTPATIENT)
Dept: GENERAL RADIOLOGY | Facility: HOSPITAL | Age: 55
End: 2025-05-14
Attending: NEUROLOGICAL SURGERY
Payer: COMMERCIAL

## 2025-05-14 ENCOUNTER — ANESTHESIA EVENT (OUTPATIENT)
Dept: SURGERY | Facility: HOSPITAL | Age: 55
End: 2025-05-14
Payer: COMMERCIAL

## 2025-05-14 ENCOUNTER — HOSPITAL ENCOUNTER (OUTPATIENT)
Facility: HOSPITAL | Age: 55
Discharge: HOME | End: 2025-05-15
Attending: NEUROLOGICAL SURGERY | Admitting: NEUROLOGICAL SURGERY
Payer: COMMERCIAL

## 2025-05-14 DIAGNOSIS — M54.12 CERVICAL RADICULOPATHY AT C8: ICD-10-CM

## 2025-05-14 DIAGNOSIS — Z01.818 PRE-OP TESTING: Primary | ICD-10-CM

## 2025-05-14 LAB
B-HCG UR QL: NEGATIVE
RH BLOOD TYPE: POSITIVE

## 2025-05-14 PROCEDURE — 99203 OFFICE O/P NEW LOW 30 MIN: CPT | Performed by: HOSPITALIST

## 2025-05-14 PROCEDURE — 63045 LAM FACETEC & FORAMOT CRV: CPT | Performed by: PHYSICIAN ASSISTANT

## 2025-05-14 PROCEDURE — 76000 FLUOROSCOPY <1 HR PHYS/QHP: CPT | Performed by: NEUROLOGICAL SURGERY

## 2025-05-14 PROCEDURE — 63045 LAM FACETEC & FORAMOT CRV: CPT | Performed by: NEUROLOGICAL SURGERY

## 2025-05-14 RX ORDER — LIDOCAINE HYDROCHLORIDE ANHYDROUS AND DEXTROSE MONOHYDRATE .8; 5 G/100ML; G/100ML
INJECTION, SOLUTION INTRAVENOUS CONTINUOUS PRN
Status: DISCONTINUED | OUTPATIENT
Start: 2025-05-14 | End: 2025-05-14 | Stop reason: SURG

## 2025-05-14 RX ORDER — OXYCODONE HYDROCHLORIDE 5 MG/1
5 TABLET ORAL ONCE AS NEEDED
Status: DISCONTINUED | OUTPATIENT
Start: 2025-05-14 | End: 2025-05-14 | Stop reason: HOSPADM

## 2025-05-14 RX ORDER — NALOXONE HYDROCHLORIDE 0.4 MG/ML
0.08 INJECTION, SOLUTION INTRAMUSCULAR; INTRAVENOUS; SUBCUTANEOUS AS NEEDED
Status: DISCONTINUED | OUTPATIENT
Start: 2025-05-14 | End: 2025-05-14 | Stop reason: HOSPADM

## 2025-05-14 RX ORDER — SENNOSIDES 8.6 MG
17.2 TABLET ORAL NIGHTLY
Status: DISCONTINUED | OUTPATIENT
Start: 2025-05-14 | End: 2025-05-15

## 2025-05-14 RX ORDER — SCOPOLAMINE 1 MG/3D
1 PATCH, EXTENDED RELEASE TRANSDERMAL ONCE
Status: DISCONTINUED | OUTPATIENT
Start: 2025-05-14 | End: 2025-05-15

## 2025-05-14 RX ORDER — BUPIVACAINE HYDROCHLORIDE AND EPINEPHRINE 2.5; 5 MG/ML; UG/ML
INJECTION, SOLUTION EPIDURAL; INFILTRATION; INTRACAUDAL; PERINEURAL AS NEEDED
Status: DISCONTINUED | OUTPATIENT
Start: 2025-05-14 | End: 2025-05-14 | Stop reason: HOSPADM

## 2025-05-14 RX ORDER — BISACODYL 10 MG
10 SUPPOSITORY, RECTAL RECTAL
Status: DISCONTINUED | OUTPATIENT
Start: 2025-05-14 | End: 2025-05-15

## 2025-05-14 RX ORDER — DIPHENHYDRAMINE HYDROCHLORIDE 50 MG/ML
25 INJECTION, SOLUTION INTRAMUSCULAR; INTRAVENOUS EVERY 4 HOURS PRN
Status: DISCONTINUED | OUTPATIENT
Start: 2025-05-14 | End: 2025-05-15

## 2025-05-14 RX ORDER — HYDROMORPHONE HYDROCHLORIDE 1 MG/ML
0.2 INJECTION, SOLUTION INTRAMUSCULAR; INTRAVENOUS; SUBCUTANEOUS EVERY 2 HOUR PRN
Refills: 0 | Status: DISCONTINUED | OUTPATIENT
Start: 2025-05-14 | End: 2025-05-15

## 2025-05-14 RX ORDER — PROCHLORPERAZINE EDISYLATE 5 MG/ML
5 INJECTION INTRAMUSCULAR; INTRAVENOUS EVERY 8 HOURS PRN
Status: DISCONTINUED | OUTPATIENT
Start: 2025-05-14 | End: 2025-05-14 | Stop reason: HOSPADM

## 2025-05-14 RX ORDER — MORPHINE SULFATE 0.5 MG/ML
INJECTION, SOLUTION EPIDURAL; INTRATHECAL; INTRAVENOUS AS NEEDED
Status: DISCONTINUED | OUTPATIENT
Start: 2025-05-14 | End: 2025-05-14 | Stop reason: HOSPADM

## 2025-05-14 RX ORDER — ECHINACEA PURPUREA EXTRACT 125 MG
1 TABLET ORAL
Status: DISCONTINUED | OUTPATIENT
Start: 2025-05-14 | End: 2025-05-15

## 2025-05-14 RX ORDER — CETIRIZINE HYDROCHLORIDE 10 MG/1
10 TABLET ORAL DAILY
Status: DISCONTINUED | OUTPATIENT
Start: 2025-05-14 | End: 2025-05-15

## 2025-05-14 RX ORDER — PROCHLORPERAZINE EDISYLATE 5 MG/ML
5 INJECTION INTRAMUSCULAR; INTRAVENOUS EVERY 8 HOURS PRN
Status: DISCONTINUED | OUTPATIENT
Start: 2025-05-14 | End: 2025-05-15

## 2025-05-14 RX ORDER — HYDROMORPHONE HYDROCHLORIDE 1 MG/ML
0.6 INJECTION, SOLUTION INTRAMUSCULAR; INTRAVENOUS; SUBCUTANEOUS EVERY 5 MIN PRN
Status: DISCONTINUED | OUTPATIENT
Start: 2025-05-14 | End: 2025-05-14 | Stop reason: HOSPADM

## 2025-05-14 RX ORDER — BUPROPION HYDROCHLORIDE 300 MG/1
300 TABLET ORAL DAILY
Status: DISCONTINUED | OUTPATIENT
Start: 2025-05-14 | End: 2025-05-15

## 2025-05-14 RX ORDER — ACETAMINOPHEN 500 MG
1000 TABLET ORAL ONCE
Status: DISCONTINUED | OUTPATIENT
Start: 2025-05-14 | End: 2025-05-14 | Stop reason: HOSPADM

## 2025-05-14 RX ORDER — METHOCARBAMOL 100 MG/ML
1000 INJECTION, SOLUTION INTRAMUSCULAR; INTRAVENOUS ONCE
Status: COMPLETED | OUTPATIENT
Start: 2025-05-14 | End: 2025-05-14

## 2025-05-14 RX ORDER — HYDROMORPHONE HYDROCHLORIDE 1 MG/ML
0.2 INJECTION, SOLUTION INTRAMUSCULAR; INTRAVENOUS; SUBCUTANEOUS EVERY 5 MIN PRN
Status: DISCONTINUED | OUTPATIENT
Start: 2025-05-14 | End: 2025-05-14 | Stop reason: HOSPADM

## 2025-05-14 RX ORDER — MEPERIDINE HYDROCHLORIDE 25 MG/ML
12.5 INJECTION INTRAMUSCULAR; INTRAVENOUS; SUBCUTANEOUS AS NEEDED
Status: DISCONTINUED | OUTPATIENT
Start: 2025-05-14 | End: 2025-05-14 | Stop reason: HOSPADM

## 2025-05-14 RX ORDER — ACETAMINOPHEN 10 MG/ML
INJECTION, SOLUTION INTRAVENOUS AS NEEDED
Status: DISCONTINUED | OUTPATIENT
Start: 2025-05-14 | End: 2025-05-14 | Stop reason: SURG

## 2025-05-14 RX ORDER — POLYETHYLENE GLYCOL 3350 17 G/17G
17 POWDER, FOR SOLUTION ORAL DAILY PRN
Status: DISCONTINUED | OUTPATIENT
Start: 2025-05-14 | End: 2025-05-15

## 2025-05-14 RX ORDER — SODIUM CHLORIDE 9 MG/ML
INJECTION, SOLUTION INTRAVENOUS CONTINUOUS
Status: ACTIVE | OUTPATIENT
Start: 2025-05-14 | End: 2025-05-14

## 2025-05-14 RX ORDER — SODIUM CHLORIDE, SODIUM LACTATE, POTASSIUM CHLORIDE, CALCIUM CHLORIDE 600; 310; 30; 20 MG/100ML; MG/100ML; MG/100ML; MG/100ML
INJECTION, SOLUTION INTRAVENOUS CONTINUOUS
Status: DISCONTINUED | OUTPATIENT
Start: 2025-05-14 | End: 2025-05-15

## 2025-05-14 RX ORDER — OXYCODONE HYDROCHLORIDE 5 MG/1
10 TABLET ORAL ONCE AS NEEDED
Status: DISCONTINUED | OUTPATIENT
Start: 2025-05-14 | End: 2025-05-14 | Stop reason: HOSPADM

## 2025-05-14 RX ORDER — DIPHENHYDRAMINE HCL 25 MG
25 CAPSULE ORAL EVERY 4 HOURS PRN
Status: DISCONTINUED | OUTPATIENT
Start: 2025-05-14 | End: 2025-05-15

## 2025-05-14 RX ORDER — HYDROCODONE BITARTRATE AND ACETAMINOPHEN 5; 325 MG/1; MG/1
2 TABLET ORAL EVERY 4 HOURS PRN
Status: DISCONTINUED | OUTPATIENT
Start: 2025-05-14 | End: 2025-05-15

## 2025-05-14 RX ORDER — ONDANSETRON 2 MG/ML
4 INJECTION INTRAMUSCULAR; INTRAVENOUS EVERY 6 HOURS PRN
Status: DISCONTINUED | OUTPATIENT
Start: 2025-05-14 | End: 2025-05-14 | Stop reason: HOSPADM

## 2025-05-14 RX ORDER — HYDROCODONE BITARTRATE AND ACETAMINOPHEN 5; 325 MG/1; MG/1
1 TABLET ORAL EVERY 4 HOURS PRN
Status: DISCONTINUED | OUTPATIENT
Start: 2025-05-14 | End: 2025-05-15

## 2025-05-14 RX ORDER — MIDAZOLAM HYDROCHLORIDE 1 MG/ML
1 INJECTION INTRAMUSCULAR; INTRAVENOUS EVERY 5 MIN PRN
Status: DISCONTINUED | OUTPATIENT
Start: 2025-05-14 | End: 2025-05-14 | Stop reason: HOSPADM

## 2025-05-14 RX ORDER — GABAPENTIN 300 MG/1
300 CAPSULE ORAL 3 TIMES DAILY
Status: DISCONTINUED | OUTPATIENT
Start: 2025-05-14 | End: 2025-05-15

## 2025-05-14 RX ORDER — DIPHENHYDRAMINE HYDROCHLORIDE 50 MG/ML
12.5 INJECTION, SOLUTION INTRAMUSCULAR; INTRAVENOUS AS NEEDED
Status: DISCONTINUED | OUTPATIENT
Start: 2025-05-14 | End: 2025-05-14 | Stop reason: HOSPADM

## 2025-05-14 RX ORDER — ONDANSETRON 2 MG/ML
4 INJECTION INTRAMUSCULAR; INTRAVENOUS EVERY 6 HOURS PRN
Status: DISCONTINUED | OUTPATIENT
Start: 2025-05-14 | End: 2025-05-15

## 2025-05-14 RX ORDER — ONDANSETRON 2 MG/ML
INJECTION INTRAMUSCULAR; INTRAVENOUS AS NEEDED
Status: DISCONTINUED | OUTPATIENT
Start: 2025-05-14 | End: 2025-05-14 | Stop reason: SURG

## 2025-05-14 RX ORDER — PANTOPRAZOLE SODIUM 40 MG/1
40 TABLET, DELAYED RELEASE ORAL DAILY
Status: DISCONTINUED | OUTPATIENT
Start: 2025-05-14 | End: 2025-05-15

## 2025-05-14 RX ORDER — HYDROMORPHONE HYDROCHLORIDE 1 MG/ML
0.4 INJECTION, SOLUTION INTRAMUSCULAR; INTRAVENOUS; SUBCUTANEOUS EVERY 2 HOUR PRN
Refills: 0 | Status: DISCONTINUED | OUTPATIENT
Start: 2025-05-14 | End: 2025-05-15

## 2025-05-14 RX ORDER — METHYLPREDNISOLONE ACETATE 80 MG/ML
INJECTION, SUSPENSION INTRA-ARTICULAR; INTRALESIONAL; INTRAMUSCULAR; SOFT TISSUE AS NEEDED
Status: DISCONTINUED | OUTPATIENT
Start: 2025-05-14 | End: 2025-05-14 | Stop reason: HOSPADM

## 2025-05-14 RX ORDER — HYDROMORPHONE HYDROCHLORIDE 1 MG/ML
0.8 INJECTION, SOLUTION INTRAMUSCULAR; INTRAVENOUS; SUBCUTANEOUS EVERY 2 HOUR PRN
Refills: 0 | Status: DISCONTINUED | OUTPATIENT
Start: 2025-05-14 | End: 2025-05-15

## 2025-05-14 RX ORDER — ROCURONIUM BROMIDE 10 MG/ML
INJECTION, SOLUTION INTRAVENOUS AS NEEDED
Status: DISCONTINUED | OUTPATIENT
Start: 2025-05-14 | End: 2025-05-14 | Stop reason: SURG

## 2025-05-14 RX ORDER — DOCUSATE SODIUM 100 MG/1
100 CAPSULE, LIQUID FILLED ORAL 2 TIMES DAILY
Status: DISCONTINUED | OUTPATIENT
Start: 2025-05-14 | End: 2025-05-15

## 2025-05-14 RX ORDER — DEXAMETHASONE SODIUM PHOSPHATE 4 MG/ML
VIAL (ML) INJECTION AS NEEDED
Status: DISCONTINUED | OUTPATIENT
Start: 2025-05-14 | End: 2025-05-14 | Stop reason: SURG

## 2025-05-14 RX ORDER — ACETAMINOPHEN 325 MG/1
650 TABLET ORAL EVERY 6 HOURS PRN
Status: DISCONTINUED | OUTPATIENT
Start: 2025-05-14 | End: 2025-05-15

## 2025-05-14 RX ORDER — METHOCARBAMOL 100 MG/ML
INJECTION, SOLUTION INTRAMUSCULAR; INTRAVENOUS
Status: COMPLETED
Start: 2025-05-14 | End: 2025-05-14

## 2025-05-14 RX ORDER — ONDANSETRON 2 MG/ML
INJECTION INTRAMUSCULAR; INTRAVENOUS
Status: COMPLETED
Start: 2025-05-14 | End: 2025-05-14

## 2025-05-14 RX ORDER — SODIUM PHOSPHATE, DIBASIC AND SODIUM PHOSPHATE, MONOBASIC 7; 19 G/230ML; G/230ML
1 ENEMA RECTAL ONCE AS NEEDED
Status: DISCONTINUED | OUTPATIENT
Start: 2025-05-14 | End: 2025-05-15

## 2025-05-14 RX ORDER — PHENYLEPHRINE HCL 10 MG/ML
VIAL (ML) INJECTION AS NEEDED
Status: DISCONTINUED | OUTPATIENT
Start: 2025-05-14 | End: 2025-05-14 | Stop reason: SURG

## 2025-05-14 RX ORDER — SODIUM CHLORIDE, SODIUM LACTATE, POTASSIUM CHLORIDE, CALCIUM CHLORIDE 600; 310; 30; 20 MG/100ML; MG/100ML; MG/100ML; MG/100ML
INJECTION, SOLUTION INTRAVENOUS CONTINUOUS
Status: DISCONTINUED | OUTPATIENT
Start: 2025-05-14 | End: 2025-05-14 | Stop reason: HOSPADM

## 2025-05-14 RX ORDER — HYDROMORPHONE HYDROCHLORIDE 1 MG/ML
0.4 INJECTION, SOLUTION INTRAMUSCULAR; INTRAVENOUS; SUBCUTANEOUS EVERY 5 MIN PRN
Status: DISCONTINUED | OUTPATIENT
Start: 2025-05-14 | End: 2025-05-14 | Stop reason: HOSPADM

## 2025-05-14 RX ORDER — HYDROMORPHONE HYDROCHLORIDE 1 MG/ML
INJECTION, SOLUTION INTRAMUSCULAR; INTRAVENOUS; SUBCUTANEOUS
Status: COMPLETED
Start: 2025-05-14 | End: 2025-05-14

## 2025-05-14 RX ADMIN — ONDANSETRON 4 MG: 2 INJECTION INTRAMUSCULAR; INTRAVENOUS at 10:55:00

## 2025-05-14 RX ADMIN — DEXAMETHASONE SODIUM PHOSPHATE 8 MG: 4 MG/ML VIAL (ML) INJECTION at 10:04:00

## 2025-05-14 RX ADMIN — LIDOCAINE HYDROCHLORIDE ANHYDROUS AND DEXTROSE MONOHYDRATE 2 MG/KG/HR: .8; 5 INJECTION, SOLUTION INTRAVENOUS at 10:04:00

## 2025-05-14 RX ADMIN — SODIUM CHLORIDE, SODIUM LACTATE, POTASSIUM CHLORIDE, CALCIUM CHLORIDE: 600; 310; 30; 20 INJECTION, SOLUTION INTRAVENOUS at 09:59:00

## 2025-05-14 RX ADMIN — SODIUM CHLORIDE, SODIUM LACTATE, POTASSIUM CHLORIDE, CALCIUM CHLORIDE: 600; 310; 30; 20 INJECTION, SOLUTION INTRAVENOUS at 11:21:00

## 2025-05-14 RX ADMIN — PHENYLEPHRINE HCL 100 MCG: 10 MG/ML VIAL (ML) INJECTION at 10:21:00

## 2025-05-14 RX ADMIN — ACETAMINOPHEN 1000 MG: 10 INJECTION, SOLUTION INTRAVENOUS at 10:55:00

## 2025-05-14 RX ADMIN — PHENYLEPHRINE HCL 100 MCG: 10 MG/ML VIAL (ML) INJECTION at 10:30:00

## 2025-05-14 RX ADMIN — ROCURONIUM BROMIDE 40 MG: 10 INJECTION, SOLUTION INTRAVENOUS at 10:07:00

## 2025-05-14 NOTE — PROGRESS NOTES
1608: Pt is POD #0 of Left Posterior C7-T1 Foraminotomy. She is having a significant amount of pain. She currently has scheduled robaxin 500 mg tid, scheduled gabapentin 300 mg tid, and norco 5/325 1-2 tabs q 4 hrs prn. She doesn't have any IV PRN meds. Page sent to Dr Bhakta to ask if he would like to add the MSO4 panel or the low dose dilaudid panel PRN? Or increase the norco to 10/325 1-2 q 4 prn? Awaiting response.    1440: Spoke to Dr Bhakta. Dilaudid panel ordered.

## 2025-05-14 NOTE — OPERATIVE REPORT
Operative Note    Patient Name: Charlotte Avalos    Preoperative Diagnosis: Cervical radiculopathy at C8 [M54.12]    Postoperative Diagnosis: same    Primary Surgeon: Ramon Bhakta MD    Assistant: Yvonne Montero PA-C, essential the case including opening, closing and retraction    Procedures: Left cervical 7/thoracic 1 foraminotomies    Surgical Findings: See dictation    Anesthesia: General    Complications: none    Implants: None    Specimen: None    Drains: None    Condition: Stable    Estimated Blood Loss: 5 mL    Indication for Procedure: 54-year-old female with previous anterior cervical surgeries.  Patient had recurrent C8 radiculopathy.  Patient was seen in clinic.  Patient failed nonoperative treatments.  Patient wished to proceed with surgical decompression.    Procedure: Patient properly identified and brought back to the OR 16.  Patient placed under general esthesia by anesthesia staff.  Gallegos attached the patient's head.  Patient was placed on the OR table, position and all pressure points padded.  Gallegos was attached to the OR table.  Fluoroscopy shot to confirm her levels.  Patient was sterilely prepped and draped in usual fashion.  10 mL of quarter percent Marcaine with epinephrine was given as local anesthetic.  Incision made dissection down to fascia.  A subperiosteal dissection was performed along C7 and T1 on the left.  Deep retractor was placed.  Hemostasis was achieved.  Fluoroscopy again confirmed our levels.  Inferior lamina of C7 was delayed with a curette.  Then used Kerrisons and curettes to perform the foraminotomies.  Once was complete the probe was passed freely out the foramen.  The nerve was nicely decompressed as it was seen.  Hemostasis was achieved.  A slurry of Avitene mixed with Depo-Medrol Duramorph was placed over the nerve.  The wound was then closed in layers.  Gallegos was attached on the OR table.  Patient was placed back on the hospital bed.  Gallegos was  detached in the patient's head.  Patient was awakened by anesthesia and taken recovery.    Dictated but not proofread

## 2025-05-14 NOTE — PLAN OF CARE
Pt A&O. On room air. Encouraged use of incentive spirometer and ankle pump exercises every hour while awake. Scds to BLE. Tolerating diet. Passed dysphagia screen. Had BM this morning before surgery. Voiding w/o difficulty. Pain moderately managed with current medications. Therapy to see pt tomorrow. Pt up with standby assist using gait belt. Posterior neck incision w/  dermabond. Incision well approximated and left open to air. No drainage noted. No cervical collar needed. Pt plans to be dc'd home when cleared, possibly tomorrow. Pt's spouse, Salvatore, at bedside and updated with plan of care.       1830: Pt voiced concerns this evening about being ready for discharge home with her current pain levels. Pt states she has taken norco after all of her prior surgeries and that it doesn't work well for her at all. Discussed possibility of changing norco to scheduled tylenol and prn oxycodone if ok with Dr Bhakta tomorrow. Pt in agreement with plan.

## 2025-05-14 NOTE — ANESTHESIA POSTPROCEDURE EVALUATION
[facility]    Charlotte Avalos Patient Status:  Outpatient in a Bed   Age/Gender 54 year old female MRN [id number]   Location [facility] SURGERY Attending Ramon Bhakta MD   Hosp Day # 0 PCP Aidan Bourgeois MD       Anesthesia Post-op Note    LEFT POSTERIOR CERVICAL 7 -THORACIC 1 FORAMINOTOMY    Procedure Summary       Date: 05/14/25 Room / Location:  MAIN OR [address] [place] [address] MAIN OR    Anesthesia Start: 0955 Anesthesia Stop: 1124    Procedure: LEFT POSTERIOR CERVICAL 7 -THORACIC 1 FORAMINOTOMY (Left: Spine Cervical) Diagnosis:       Cervical radiculopathy at C8      (Cervical radiculopathy at C8 [M54.12])    Surgeons: Ramon Bhakta MD Anesthesiologist: Frank Varghese MD    Anesthesia Type: general ASA Status: 2            Anesthesia Type: general    Vitals Value Taken Time   /79 05/14/25 11:27   Temp 98.1 05/14/25 11:27   Pulse 88 05/14/25 11:27   Resp 18 05/14/25 11:27   SpO2 98% 05/14/25 11:27           Patient Location: PACU    Anesthesia Type: general    Airway Patency: patent    Postop Pain Control: adequate    Mental Status: mildly sedated but able to meaningfully participate in the post-anesthesia evaluation    Nausea/Vomiting: none    Cardiopulmonary/Hydration status: stable euvolemic    Complications: no apparent anesthesia related complications    Postop vital signs: stable    Dental Exam: Unchanged from Preop    Patient to be discharged from PACU when criteria met.

## 2025-05-14 NOTE — ANESTHESIA PROCEDURE NOTES
Airway  Date/Time: 5/14/2025 10:03 AM  Reason: elective      General Information and Staff   Patient location during procedure: OR  Anesthesiologist: Frank Varghese MD  Resident/CRNA: Estuardo Antonio CRNA  Performed: CRNA   Performed by: Estuardo Antonio CRNA  Authorized by: Frank Varghese MD        Indications and Patient Condition  Indications for airway management: anesthesia  Sedation level: deep      Preoxygenated: yesPatient position: sniffing    Mask difficulty assessment: 0 - not attempted    Final Airway Details    Final airway type: endotracheal airway    Successful airway: ETT  Cuffed: yes   Successful intubation technique: Video laryngoscopy  Endotracheal tube insertion site: oral  Blade: GlideScope  Blade size: #3  ETT size (mm): 7.5    Cormack-Lehane Classification: grade I - full view of glottis  Placement verified by: capnometry   Measured from: lips  ETT to lips (cm): 21  Number of attempts at approach: 1

## 2025-05-14 NOTE — ANESTHESIA PREPROCEDURE EVALUATION
PRE-OP EVALUATION    Patient Name: Charlotte Avalos    Admit Diagnosis: Cervical radiculopathy at C8 [M54.12]    Pre-op Diagnosis: Cervical radiculopathy at C8 [M54.12]    LEFT POSTERIOR CERVICAL 7 -THORACIC 1 FORAMINOTOMY    Anesthesia Procedure: LEFT POSTERIOR CERVICAL 7 -THORACIC 1 FORAMINOTOMY (Left: Spine Cervical)    Surgeons and Role:     * Ramon Bhakta MD - Primary    Pre-op vitals reviewed.        Body mass index is 31.76 kg/m².    Current medications reviewed.  Hospital Medications:  Current Medications[1]    Outpatient Medications:   Prescriptions Prior to Admission[2]    Allergies: Patient has no known allergies.      Anesthesia Evaluation    Patient summary reviewed.    Anesthetic Complications           GI/Hepatic/Renal      (+) GERD                           Cardiovascular                (+) obesity  (+) hypertension                                     Endo/Other    Negative endo/other ROS.                              Pulmonary                           Neuro/Psych                 (+) neuromuscular disease                     Past Surgical History[3]  Social Hx on file[4]  History   Drug Use Unknown     Comment: gummies occ - has not used since 2/2025     Available pre-op labs reviewed.  Lab Results   Component Value Date    WBC 5.1 05/01/2025    RBC 4.73 05/01/2025    HGB 14.6 05/01/2025    HCT 43.8 05/01/2025    MCV 92.6 05/01/2025    MCH 30.9 05/01/2025    MCHC 33.3 05/01/2025    RDW 13.3 05/01/2025    .0 05/01/2025     Lab Results   Component Value Date     04/01/2025    K 4.6 04/01/2025     04/01/2025    CO2 29.0 04/01/2025    BUN 11 04/01/2025    CREATSERUM 0.81 04/01/2025    GLU 92 04/01/2025    CA 9.6 04/01/2025     Lab Results   Component Value Date    INR 0.94 05/01/2025         Airway      Mallampati: II       Cardiovascular    Cardiovascular exam normal.         Dental    Dentition appears grossly intact         Pulmonary    Pulmonary exam normal.                  Other findings              ASA: 2   Plan: general  NPO status verified and           Plan/risks discussed with: patient                Present on Admission:  **None**             [1]    acetaminophen (Tylenol Extra Strength) tab 1,000 mg  1,000 mg Oral Once    scopolamine (Transderm-Scop) 1 MG/3DAYS patch 1 patch  1 patch Transdermal Once    lactated ringers infusion   Intravenous Continuous    ceFAZolin (Ancef) 2g in 10mL IV syringe premix  2 g Intravenous Once   [2]   Medications Prior to Admission   Medication Sig Dispense Refill Last Dose/Taking    buPROPion  MG Oral Tablet 24 Hr Take 1 tablet (300 mg total) by mouth daily. 90 tablet 1 5/13/2025 at  8:00 AM    cetirizine 10 MG Oral Tab Take 1 tablet (10 mg total) by mouth in the morning.   5/13/2025 at  8:00 AM    triamcinolone 0.1 % External Cream Apply ot affected skin 2 times a day for 14 days max 60 g 3 5/14/2025 at  6:00 AM    gabapentin 300 MG Oral Cap Take 1 capsule (300 mg total) by mouth 3 (three) times daily. 90 capsule 2 5/13/2025 at  7:00 PM    pantoprazole 40 MG Oral Tab EC Take 1 tablet (40 mg total) by mouth in the morning.   5/13/2025 at  8:00 AM    Varenicline Tartrate, Starter, (CHANTIX STARTING MONTH PAK) 0.5 MG X 11 & 1 MG X 42 Oral Tablet Therapy Pack Take 1 Dose by mouth As Directed. By starter pack 1 each 0     Omega-3 Fatty Acids (FISH OIL OR) Take 1 capsule by mouth daily.   4/23/2025   [3]   Past Surgical History:  Procedure Laterality Date    Back surgery  01/01/2013    cer fusion revision     Other surgical history  2003    C5-C6 Fusion    Other surgical history  2007    C6-C7 FUSION    Spinal fusion  2002    C5 -c 6 FUSION   [4]   Social History  Socioeconomic History    Marital status:    Tobacco Use    Smoking status: Some Days     Current packs/day: 0.50     Types: Cigarettes     Passive exposure: Never    Smokeless tobacco: Never    Tobacco comments:     Will start wellbutrin   Vaping Use    Vaping status: Never  Used   Substance and Sexual Activity    Alcohol use: Yes     Comment: socially    Drug use: Not Currently     Types: Cannabis     Comment: gummies occ - has not used since 2/2025   Other Topics Concern    Caffeine Concern No    Exercise No

## 2025-05-14 NOTE — CONSULTS
[place] [facility]OSPITALIST  CONSULT     Charlotte Avalos Patient Status:  Outpatient in a Bed    1970 MRN [id number]   Location [facility] 3SW-A Attending Ramon Bhakta MD   Hosp Day # 0 PCP Aidan Bourgeois MD     Reason for consult: BP mgmt     Requested by: Dr. Bhakta    Subjective:   History of Present Illness:     Charlotte Avalos is a 54 year old female with a PMH of HTN who presents for a surgical procedure.    Patient seen post operatively, pain is controlled.  Denies n/v.  No recent fevers, cough, no congestion.  No cp or sob.  No other complaints.      History/Other:    Past Medical History:  Past Medical History[1]  Past Surgical History:   Past Surgical History[2]   Family History:   Family History[3]  Social History:    reports that she has been smoking cigarettes. She has never been exposed to tobacco smoke. She has never used smokeless tobacco. She reports current alcohol use. She reports that she does not currently use drugs after having used the following drugs: Cannabis.     Allergies: Allergies[4]    Medications:  Medications Ordered Prior to Encounter[5]    Review of Systems:   A comprehensive review of systems was completed.    Pertinent positives and negatives noted in the HPI.    Objective:   Physical Exam:    /89 (BP Location: Right arm)   Pulse 81   Temp 98.3 °F (36.8 °C) (Oral)   Resp 11   Ht 5' 4\" (1.626 m)   Wt 207 lb 10.8 oz (94.2 kg)   LMP 2024   SpO2 99%   BMI 35.65 kg/m²   General: No acute distress, Alert  Respiratory: No rhonchi, no wheezes  Cardiovascular: S1, S2. Regular rate and rhythm  Abdomen: Soft, NT/ND, +BS  Neuro: No new focal deficits  Extremities: No edema    Results:    Labs:      Labs Last 24 Hours:  No results for input(s): \"WBC\", \"HGB\", \"MCV\", \"PLT\", \"BAND\", \"INR\" in the last 168 hours.    Invalid input(s): \"LYM#\", \"MONO#\", \"BASOS#\", \"EOSIN#\"    No results for input(s): \"GLU\", \"BUN\", \"CREATSERUM\", \"GFRAA\", \"GFRNAA\", \"CA\", \"ALB\", \"NA\",  \"K\", \"CL\", \"CO2\", \"ALKPHO\", \"AST\", \"ALT\", \"BILT\", \"TP\" in the last 168 hours.    No results for input(s): \"PTP\", \"INR\" in the last 168 hours.    No results for input(s): \"TROP\", \"CK\" in the last 168 hours.      Imaging: Imaging data reviewed in Epic.    Assessment & Plan:      #Cervical radiculopathy  S/p Left posterior C7-T1 foraminotomy   Post op care per neurosurgery  PT/OT  SCD for dvt proph    #GERD  PPI    #Anxiety   Wellbutrin           Plan of care discussed with patient, nurse     Wayne Roblero MD  5/14/2025    The 21st Century Cures Act makes medical notes like these available to patients in the interest of transparency. Please be advised this is a medical document. Medical documents are intended to carry relevant information, facts as evident, and the clinical opinion of the practitioner. The medical note is intended as peer to peer communication and may appear blunt or direct. It is written in medical language and may contain abbreviations or verbiage that are unfamiliar.            [1]   Past Medical History:   Esophageal reflux    Essential hypertension    PONV (postoperative nausea and vomiting)   [2]   Past Surgical History:  Procedure Laterality Date    Back surgery  01/01/2013    cer fusion revision     Other surgical history  2003    C5-C6 Fusion    Other surgical history  2007    C6-C7 FUSION    Spinal fusion  2002    C5 -c 6 FUSION   [3]   Family History  Problem Relation Age of Onset    Dementia Mother     Diabetes Father     Diabetes Paternal Grandmother     Diabetes Paternal Grandfather    [4] No Known Allergies  [5]   No current facility-administered medications on file prior to encounter.     Current Outpatient Medications on File Prior to Encounter   Medication Sig Dispense Refill    cetirizine 10 MG Oral Tab Take 1 tablet (10 mg total) by mouth in the morning.      triamcinolone 0.1 % External Cream Apply ot affected skin 2 times a day for 14 days max 60 g 3    gabapentin 300 MG Oral Cap  Take 1 capsule (300 mg total) by mouth 3 (three) times daily. 90 capsule 2    pantoprazole 40 MG Oral Tab EC Take 1 tablet (40 mg total) by mouth in the morning.      Omega-3 Fatty Acids (FISH OIL OR) Take 1 capsule by mouth daily.

## 2025-05-14 NOTE — BRIEF OP NOTE
Pre-Operative Diagnosis: Cervical radiculopathy at C8 [M54.12]     Post-Operative Diagnosis: Cervical radiculopathy at C8 [M54.12]      Procedure Performed:   LEFT POSTERIOR CERVICAL 7 -THORACIC 1 FORAMINOTOMY    Surgeons and Role:     * Ramon Bhakta MD - Primary    Assistant(s):  PA: Yvonne Montero PA     Surgical Findings: See dictation        Estimated Blood Loss: Blood Output: 5 mL (5/14/2025 11:02 AM)          Ramon Bhakta MD  5/14/2025  11:19 AM

## 2025-05-15 VITALS
OXYGEN SATURATION: 95 % | TEMPERATURE: 98 F | WEIGHT: 207.69 LBS | HEART RATE: 74 BPM | SYSTOLIC BLOOD PRESSURE: 140 MMHG | DIASTOLIC BLOOD PRESSURE: 87 MMHG | RESPIRATION RATE: 18 BRPM | HEIGHT: 64 IN | BODY MASS INDEX: 35.46 KG/M2

## 2025-05-15 LAB
ANION GAP SERPL CALC-SCNC: 8 MMOL/L (ref 0–18)
BASOPHILS # BLD AUTO: 0.02 X10(3) UL (ref 0–0.2)
BASOPHILS NFR BLD AUTO: 0.2 %
BUN BLD-MCNC: 7 MG/DL (ref 9–23)
CALCIUM BLD-MCNC: 9.2 MG/DL (ref 8.7–10.6)
CHLORIDE SERPL-SCNC: 108 MMOL/L (ref 98–112)
CO2 SERPL-SCNC: 22 MMOL/L (ref 21–32)
CREAT BLD-MCNC: 0.78 MG/DL (ref 0.55–1.02)
EGFRCR SERPLBLD CKD-EPI 2021: 91 ML/MIN/1.73M2 (ref 60–?)
EOSINOPHIL # BLD AUTO: 0.02 X10(3) UL (ref 0–0.7)
EOSINOPHIL NFR BLD AUTO: 0.2 %
ERYTHROCYTE [DISTWIDTH] IN BLOOD BY AUTOMATED COUNT: 13.2 %
GLUCOSE BLD-MCNC: 117 MG/DL (ref 70–99)
HCT VFR BLD AUTO: 38.7 % (ref 35–48)
HGB BLD-MCNC: 13 G/DL (ref 12–16)
IMM GRANULOCYTES # BLD AUTO: 0.04 X10(3) UL (ref 0–1)
IMM GRANULOCYTES NFR BLD: 0.3 %
LYMPHOCYTES # BLD AUTO: 1.33 X10(3) UL (ref 1–4)
LYMPHOCYTES NFR BLD AUTO: 10.7 %
MAGNESIUM SERPL-MCNC: 1.9 MG/DL (ref 1.6–2.6)
MCH RBC QN AUTO: 29.7 PG (ref 26–34)
MCHC RBC AUTO-ENTMCNC: 33.6 G/DL (ref 31–37)
MCV RBC AUTO: 88.6 FL (ref 80–100)
MONOCYTES # BLD AUTO: 0.68 X10(3) UL (ref 0.1–1)
MONOCYTES NFR BLD AUTO: 5.5 %
NEUTROPHILS # BLD AUTO: 10.34 X10 (3) UL (ref 1.5–7.7)
NEUTROPHILS # BLD AUTO: 10.34 X10(3) UL (ref 1.5–7.7)
NEUTROPHILS NFR BLD AUTO: 83.1 %
OSMOLALITY SERPL CALC.SUM OF ELEC: 285 MOSM/KG (ref 275–295)
PLATELET # BLD AUTO: 232 10(3)UL (ref 150–450)
POTASSIUM SERPL-SCNC: 4.2 MMOL/L (ref 3.5–5.1)
RBC # BLD AUTO: 4.37 X10(6)UL (ref 3.8–5.3)
SODIUM SERPL-SCNC: 138 MMOL/L (ref 136–145)
WBC # BLD AUTO: 12.4 X10(3) UL (ref 4–11)

## 2025-05-15 PROCEDURE — 99214 OFFICE O/P EST MOD 30 MIN: CPT | Performed by: HOSPITALIST

## 2025-05-15 RX ORDER — HYDROCODONE BITARTRATE AND ACETAMINOPHEN 5; 325 MG/1; MG/1
1-2 TABLET ORAL EVERY 6 HOURS PRN
Qty: 30 TABLET | Refills: 0 | Status: SHIPPED | OUTPATIENT
Start: 2025-05-15 | End: 2025-05-15

## 2025-05-15 RX ORDER — OXYCODONE HYDROCHLORIDE 5 MG/1
TABLET ORAL EVERY 6 HOURS PRN
Qty: 30 TABLET | Refills: 0 | Status: SHIPPED | OUTPATIENT
Start: 2025-05-15 | End: 2025-05-21

## 2025-05-15 RX ORDER — ACETAMINOPHEN 325 MG/1
650 TABLET ORAL EVERY 6 HOURS
Qty: 30 TABLET | Refills: 0 | Status: SHIPPED | OUTPATIENT
Start: 2025-05-15

## 2025-05-15 RX ORDER — METHOCARBAMOL 500 MG/1
500 TABLET, FILM COATED ORAL 3 TIMES DAILY
Qty: 30 TABLET | Refills: 0 | Status: SHIPPED | OUTPATIENT
Start: 2025-05-15 | End: 2025-05-21

## 2025-05-15 RX ORDER — OXYCODONE HYDROCHLORIDE 10 MG/1
10 TABLET ORAL EVERY 4 HOURS PRN
Refills: 0 | Status: DISCONTINUED | OUTPATIENT
Start: 2025-05-15 | End: 2025-05-15

## 2025-05-15 RX ORDER — OXYCODONE HYDROCHLORIDE 5 MG/1
5 TABLET ORAL EVERY 4 HOURS PRN
Refills: 0 | Status: DISCONTINUED | OUTPATIENT
Start: 2025-05-15 | End: 2025-05-15

## 2025-05-15 RX ORDER — ACETAMINOPHEN 325 MG/1
650 TABLET ORAL EVERY 6 HOURS
Status: DISCONTINUED | OUTPATIENT
Start: 2025-05-15 | End: 2025-05-15

## 2025-05-15 RX ORDER — PSEUDOEPHEDRINE HCL 30 MG
100 TABLET ORAL 2 TIMES DAILY
Qty: 30 CAPSULE | Refills: 0 | Status: SHIPPED | OUTPATIENT
Start: 2025-05-15

## 2025-05-15 NOTE — PROGRESS NOTES
[facility]   part of [facility]     Hospitalist Progress Note     Charlotte Avalos Patient Status:  Outpatient in a Bed    1970 MRN [id number]   Location [facility] 3SW-A Attending Ramon Bhakta MD   Hosp Day # 0 PCP Aidan Bourgeois MD     Chief Complaint: neck pain    Subjective:     Patient is doing well.  Denies fever, chills, n/v.  No cp or sob.  No other complaints.      Objective:    Review of Systems:   A comprehensive review of systems was completed; pertinent positive and negatives stated in subjective.    Vital signs:  Temp:  [97.8 °F (36.6 °C)-98.3 °F (36.8 °C)] 98 °F (36.7 °C)  Pulse:  [64-90] 64  Resp:  [11-20] 18  BP: (123-147)/(81-93) 140/88  SpO2:  [93 %-99 %] 93 %    Physical Exam:    General: No acute distress, pleasant   Respiratory: No wheezes, no rhonchi  Cardiovascular: S1, S2, regular rate and rhythm  Abdomen: Soft, Non-tender, non-distended, positive bowel sounds  Neuro: No new focal deficits.   Extremities: No edema    Diagnostic Data:    Labs:  Recent Labs   Lab 05/15/25  0448   WBC 12.4*   HGB 13.0   MCV 88.6   .0       Recent Labs   Lab 05/15/25  0448   *   BUN 7*   CREATSERUM 0.78   CA 9.2      K 4.2      CO2 22.0       Estimated Glomerular Filtration Rate: 91 mL/min/1.73m2 (result from lab).    No results for input(s): \"TROP\", \"TROPHS\", \"CK\" in the last 168 hours.    No results for input(s): \"PTP\", \"INR\" in the last 168 hours.               Microbiology    No results found for this visit on 25.      Imaging: Reviewed in Epic.    Medications: Scheduled Medications[1]    Assessment & Plan:      #Cervical radiculopathy  S/p Left posterior C7-T1 foraminotomy   Post op care per neurosurgery  PT/OT  SCD for dvt proph     #GERD  PPI     #Anxiety   Wellbutrin     #Leukocytosis  Likely reactive  Afebrile, no cough or dysuria     #Medically cleared for discharge home         Wayne Roblero MD    Supplementary Documentation:      Quality:  DVT Mechanical Prophylaxis: PONCE hose, SCDs,    DVT Pharmacologic Prophylaxis   Medication   None                Code Status: Not on file  Anguiano: No urinary catheter in place  Anguiano Duration (in days):   Central line:    PANCHO: 5/15/2025    Discharge is dependent on: pain, PT/OT  At this point Ms. Avalos is expected to be discharge to: Home    The 21st Century Cures Act makes medical notes like these available to patients in the interest of transparency. Please be advised this is a medical document. Medical documents are intended to carry relevant information, facts as evident, and the clinical opinion of the practitioner. The medical note is intended as peer to peer communication and may appear blunt or direct. It is written in medical language and may contain abbreviations or verbiage that are unfamiliar.                         [1]    acetaminophen  650 mg Oral q6h    scopolamine  1 patch Transdermal Once    buPROPion ER  300 mg Oral Daily    cetirizine  10 mg Oral Daily    gabapentin  300 mg Oral TID    pantoprazole  40 mg Oral Daily    sennosides  17.2 mg Oral Nightly    docusate sodium  100 mg Oral BID    methocarbamol  500 mg Oral TID

## 2025-05-15 NOTE — PLAN OF CARE
Received pt at 1900. Pt is A&O x 4; follows commands, POD 0 L C7-T1 Foraminotomy. Pt was up in chair for mos of evening and ambulated in room NOC. Pt is on RA, VSS, tolerating reg diet. Pt is continent of bowel and bladder. Pt's pain was managed NOC with PRN Norco, only requiring initial breakthrough medication at initial assessment. Pt ambulates independently. IV access is patent infusing 0.9 NS & ABTs. Shift assessment performed, HS meds given. NOC safety plan in place; bed in low position, call light and personal items within reach, pt encouraged to call staff for assistance.

## 2025-05-15 NOTE — DISCHARGE INSTRUCTIONS
May shower today. Your incision was closed with Dermabond (glue) which will peel off on its own. Keep open to air, clean and dry.  No lifting anything greater than a gallon of milk.  Do not over extend/flex/rotate   No driving until seen by Dr. Bhakta in the clinic or while taking narcotics  No NSAID's, Aspirin or Fish Oil products for 7 days after surgery  Notify Surgeon immediately for signs of infection: temp >101.5 or any drainage/swelling/redness at incision site  Call the office for any further questions at [phone]    Cervical Discharge Instructions    Dr. Bhakta    Activity  Restrictions  No twisting, pulling, pushing or lifting > 5 lbs (a gallon of milk is approximately 8 lbs)  No lifting anything over your head.  Turn your body as a unit, especially if you use a hard collar. Don’t turn your head suddenly or extremely to look from side to side.    Sitting  Sit with your knees at about the same level as your hips; use a footstool if needed.  Firm chairs with straight backs give better support’ recliners are OK to use.  Change position every 20-30 minutes when sitting (example: after sitting, stand, then you can sit again for another 20-30 minutes).    Walking is your best exercise.  Listen to your body.  Walk several times a day  Smaller distances are better.  Your goal is to increase the distance you walk each day.  Remember to listen to your body.    Stairs  Climb as needed    Sex  After two weeks and only when comfortable.  Stop if causing pain.  Check with surgeon for more information.    Driving  Check with physician at office visit when you may resume driving.  Do not drive while taking narcotics or muscle relaxants  No driving while using or needing a cervical collar.  Adhere to sitting restrictions.  You may be a passenger.  Wear your seat belt.    If You Have a Neck Brace  Hard cervical collar  Wear as instructed; may remove when showering.  Place collar back on after showering.  Wear even  when sleeping IF instructed to do so.  Exact time (weeks) collar to be worn to be determined by surgeon. Discuss at office visit.  Keep neck straight while removing and replacing collar (No bending, turning or flexing neck with collar off)  EXCEPTION: Do NOT remove collar for any reason including showering IF corepectomy done. Keep incision dry. May remove collar only after being instructed at office visit.    Soft cervical collar  Wear for comfort  May remove when showering. Replace as needed when done showering.    Incision Site Care and Dressing  Changes as directed by your surgeon    IF DERMABOND (GLUE)    May leave open to air or apply and change dressing once a day using dry sterile gauze and paper tape. May prevent clothing from rubbing incision.   Always wash hands before and after dressing changes  Watch incision for any redness, drainage, increased warmth or opening of the incision.   Call surgeon if you notice any of these.  No lotions or ointments on or near incision.                          Bathing  No tub baths or pools for 6 weeks and until cleared by surgeon.  Check with your surgeon for specific bathing/showering restrictions.  For Dermabond (glue), you do NOT need to cover incision while showering.  After shower, pat incision dry with clean towel.  Do not apply any lotions or ointments to incision.  After showering, you may apply new dry dressing.    Return to work  When cleared by surgeon. Discuss specific work activities with your surgeon.    Sleeping  Log roll to turn.  Support your neck with one pillow keeping it in a neutral position  Sleep on back or side avoiding face down position    Diet and Constipation Prevention  Soft diet may help if you have discomfort while swallowing.  Cold drinks or food may feel more soothing.  Drink six to eight glasses liquid (water, juice) per day.  Eat a high fiber diet (bran, vegetables, fruit).  Use an over the counter stool softener such as Colace or  senakot while taking narcotics to prevent constipation, or add a laxative such as Miralax or Milk of Magnesia as needed.  An enema or suppository may be needed if above measures do not work.    No Smoking  Smoking will inhibit the spine from healing .  Even one cigarette a day will cause problems.  Chewing tobacco, nicotine gum or patches will also inhibit bone healing.    Pain Management    Relaxation techniques  A way to focus your attention other than on your pain. Your brain makes endorphins that are a natural body chemical that can decrease pain. Some examples of relaxation techniques are deep breathing, listening to music or meditating.    Medication  No NSAIDS until okay with surgeon.  NSAIDS (non-steroidal anti-inflammatory) include: Aspirin, Motrin, ibuprofen, Advil, Aleve, Naprosyn, Indocin, Nuprin, Vioxx, Celebrex, Bextra. (COMPLETE LIST IN GUIDEBOOK)  Tylenol (acetaminophen) is okay. Caution if your pain med contains Tylenol (acetaminophen); maximum 3 gms of Tylenol (acetaminophen) in 24 hours.  Take muscle relaxants and pain medications as prescribed allowing 30-45 minutes to take effect.  Do NOT drink alcohol while on pain medication.  Monitor need for pain medication refills closely.  Call pharmacy or physician office at least five days before out of pain medication. If calling physician office after 3 pm, it will be handled on the next business day.    Post-op Office Visit  Schedule 2 weeks after surgery with surgeon as directed at discharge  Schedule one week follow up with Primary Care Physician. Review all medications.      When to contact your surgeon  Temp > 101F; take your temperature twice a day.  Increased pain, swelling, redness, or any drainage to incision.  Separation of incision.  Sudden reappearance of arm pain that won’t go away with pain medication.  Increased numbness or weakness.  Severe headaches.  Increased difficulty swallowing  Difficulty urinating or having a bowel  movement    Go directly to the ER or CALL 911 if you:  become short of breath  have chest pain  cough up blood  have unexplained anxiety with breathing

## 2025-05-15 NOTE — PHYSICAL THERAPY NOTE
PHYSICAL THERAPY EVALUATION - INPATIENT     Room Number: 365/365-A  Evaluation Date: 5/15/2025  Type of Evaluation: Initial  Physician Order: PT Eval and Treat    Presenting Problem: Cervical radiculopathy s/p L posterior C7-T1 foraminotomy  Co-Morbidities : GERD, anxiety  Reason for Therapy: Mobility Dysfunction and Discharge Planning    PHYSICAL THERAPY ASSESSMENT   Patient is a 54 year old female admitted 5/14/2025 for cervical radiculopathy s/p L posterior C7-T1 foraminotomy.   Patient is currently functioning near baseline with bed mobility, transfers, gait, and stair negotiation. Prior to admission, patient's baseline is independent.     Given the patient is functioning near baseline level do not anticipate skilled therapy needs at discharge .    PLAN  Patient has been evaluated and presents with no skilled Physical Therapy needs at this time.  Patient discharged from Physical Therapy services.  Please re-order if a new functional limitation presents during this admission.         GOALS  Patient was able to achieve the following goals ...    Patient was able to transfer Safely and independently   Patient able to ambulate on level surfaces Safely and independently     HOME SITUATION  Type of Home: House  Home Layout: Two level  Stairs to Enter : 2   Railing: Yes    Stairs to Bedroom: 0         Lives With: Spouse (2 dogs)    Drives: No         Prior Level of [place]: Pt typically indep with ADLs and mobility.     SUBJECTIVE  \"I'm doing well\"    OBJECTIVE  Precautions: Spine  Fall Risk: Standard fall risk    WEIGHT BEARING RESTRICTION     PAIN ASSESSMENT  Rating: Unable to rate  Location: neck  Management Techniques: Activity promotion    COGNITION  Overall Cognitive Status:  WFL - within functional limits    RANGE OF MOTION AND STRENGTH ASSESSMENT  See OT note for UE assessment    Lower extremity ROM is within functional limits      Lower extremity strength is within functional limits     BALANCE  Static  Sitting: Good  Dynamic Sitting: Fair +  Static Standing: Fair +       ADDITIONAL TESTS                                    ACTIVITY TOLERANCE                         O2 WALK       NEUROLOGICAL FINDINGS                        AM-PAC '6-Clicks' INPATIENT SHORT FORM - BASIC MOBILITY  How much difficulty does the patient currently have...  Patient Difficulty: Turning over in bed (including adjusting bedclothes, sheets and blankets)?: None   Patient Difficulty: Sitting down on and standing up from a chair with arms (e.g., wheelchair, bedside commode, etc.): None   Patient Difficulty: Moving from lying on back to sitting on the side of the bed?: None   How much help from another person does the patient currently need...   Help from Another: Moving to and from a bed to a chair (including a wheelchair)?: None   Help from Another: Need to walk in hospital room?: None   Help from Another: Climbing 3-5 steps with a railing?: None       AM-PAC Score:  Raw Score: 24   Approx Degree of Impairment: 0%   Standardized Score (AM-PAC Scale): 61.14   CMS Modifier (G-Code): CH    FUNCTIONAL ABILITY STATUS  Gait Assessment   Functional Mobility/Gait Assessment  Gait Assistance: Independent  Distance (ft): 200  Assistive Device: None  Pattern: Within Functional Limits  Stairs: Stairs, Car transfer  How Many Stairs: 4  Device: 1 Rail  Assist: Independent  Pattern: Ascend and Descend  Ascend and Descend : Reciprocal  Car transfer: x1 rep, supervision    Skilled Therapy Provided     Bed Mobility:  Rolling: NT  Supine to sit: ind   Sit to supine: ind     Transfer Mobility:  Sit to stand: ind   Stand to sit: ind  Gait = ind    Therapist's comments:RN cleared for session. Pt agreeable for therapy, received supine. Pt educated on spine precautions. Trained on log roll technique for supine<>sit for spine protection and pain mgmt. Trained on transfer in/out of car with cues for adherence to spinal precautions. Reviewed stairs. Instructed to call  for nursing staff for any needs and OOB mobility.       Exercise/Education Provided:  Bed mobility  Body mechanics  Energy conservation  Functional activity tolerated  Gait training  Posture  Strengthening  Transfer training    Patient End of Session: Up in chair, Needs met, Call light within reach, RN aware of session/findings, All patient questions and concerns addressed, Hospital anti-slip socks, Family present    Patient Evaluation Complexity Level:  History High - 3 or more personal factors and/or co-morbidities   Examination of body systems Low -  addressing 1-2 elements   Clinical Presentation Low- Stable   Clinical Decision Making Low Complexity       PT Session Time: 15 minutes  Gait Trainin minutes  Therapeutic Activity: 10 minutes

## 2025-05-15 NOTE — PLAN OF CARE
Pt A&O. On room air. Encouraged use of incentive spirometer and ankle pump exercises every hour while awake. Scds to BLE. Tolerating diet. Last BM 5/14. Miralax given this AM. Voiding w/o difficulty. Pain moderately managed with current medications. Participating in therapy as ordered. Pt up independently using gait belt. Posterior neck incision w/  dermabond. Incision well approximated and left open to air. No drainage noted. No cervical collar needed. Pt ready for dc home today. Pt's spouse, Salvatore, and sister, Reema, at bedside and updated with plan of care.     Pt and family watched discharge instruction video. All questions answered.     Pt had her Rx filled by our outpt pharmacy.

## 2025-05-15 NOTE — OCCUPATIONAL THERAPY NOTE
OCCUPATIONAL THERAPY EVALUATION - INPATIENT    Room Number: 365/365-A  Evaluation Date: 5/15/2025     Type of Evaluation: Initial  Presenting Problem: left posterior cervical C7-T1 foraminotomy    Physician Order: IP Consult to Occupational Therapy  Reason for Therapy:  ADL/IADL Dysfunction and Discharge Planning    OCCUPATIONAL THERAPY ASSESSMENT   Patient is a 54 year old female admitted on 5/14/2025 with Presenting Problem: left posterior cervical C7-T1 foraminotomy. Co-Morbidities : HTN, anxiety  Patient is currently functioning near baseline with ADLs and functional transfers.  Prior to admission, patient's baseline is independent in ADLs and IADLs.  Patient met all OT goals at mod I level.  Patient reports no further questions/concerns at this time.     Patient no longer needs skilled OT Services.    Recommendations for nursing staff:   Transfers: 1 person  Toileting location: Toilet    EVALUATION SESSION:  Patient at start of session: in bed    FUNCTIONAL TRANSFER ASSESSMENT  Sit to Stand: Edge of Bed  Edge of Bed: Modified Independent  Toilet Transfer: Modified Independent (standard toilet)    BED MOBILITY  Rolling: Modified Independent  Supine to Sit : Modified Independent    BALANCE ASSESSMENT     FUNCTIONAL ADL ASSESSMENT  UB Dressing Seated: Modified Independent (pt donned shirt and bra w/o AE)  LB Dressing Seated: Modified Independent  LB Dressing Standing: Modified Independent  Toileting Seated: Modified Independent  Pt able to cherelle pants, socks, shoes, bra, and shirt with Mod I within spine precautions.      ACTIVITY TOLERANCE: WFL                         O2 SATURATIONS       COGNITION  Overall Cognitive Status:  WFL - within functional limits    COGNITION ASSESSMENTS     Upper Extremity:   ROM: within functional limits   Strength: is within functional limits     EDUCATION PROVIDED  Patient Education : Role of Occupational Therapy; Plan of Care; Discharge Recommendations; Functional Transfer  Techniques; Surgical Precautions; Posture/Positioning; Proper Body Mechanics  Patient's Response to Education: Verbalized Understanding (pt's spouse and sister present)    Equipment used: none  Pt got out of bed, retrieved her clothes from the couch, walked to/from bathroom without RW.      Patient End of Session: Up in chair, Needs met, Call light within reach, All patient questions and concerns addressed, Family present    OCCUPATIONAL PROFILE    HOME SITUATION  Type of Home: House  Home Layout: Two level  Lives With: Spouse (2 dogs)    Toilet and Equipment: Comfort height toilet                Drives: No       Prior Level of Function: independent    SUBJECTIVE  \"The pain is about a 4, the pain meds helped.\"    PAIN ASSESSMENT  Ratin     Management Techniques: Activity promotion, Body mechanics, Relaxation, Repositioning    OBJECTIVE  Precautions: Spine  Fall Risk: High fall risk    WEIGHT BEARING RESTRICTION       AM-PAC ‘6-Clicks’ Inpatient Daily Activity Short Form  -   Putting on and taking off regular lower body clothing?: None  -   Bathing (including washing, rinsing, drying)?: A Little  -   Toileting, which includes using toilet, bedpan or urinal? : None  -   Putting on and taking off regular upper body clothing?: None  -   Taking care of personal grooming such as brushing teeth?: None  -   Eating meals?: None    AM-PAC Score:  Score: 23  Approx Degree of Impairment: 15.86%  Standardized Score (AM-PAC Scale): 51.12    ADDITIONAL TESTS     NEUROLOGICAL FINDINGS      PLAN   Patient has been evaluated and presents with no skilled Occupational Therapy needs at this time.  Patient discharged from Occupational Therapy services.  Please re-order if a new functional limitation presents during this admission.         Patient Evaluation Complexity Level:   Occupational Profile/Medical History LOW - Brief history including review of medical or therapy records    Specific performance deficits impacting engagement  in ADL/IADL LOW  1 - 3 performance deficits    Client Assessment/Performance Deficits LOW - No comorbidities nor modifications of tasks    Clinical Decision Making LOW - Analysis of occupational profile, problem-focused assessments, limited treatment options    Overall Complexity LOW     OT Session Time: 20 minutes  Self-Care Home Management: 10 minutes  Therapeutic Activity: 5 minutes  Neuromuscular Re-education: 0 minutes  Therapeutic Exercise: 0 minutes  Cognitive Skills: 0 minutes  Sensory Integrative: 0 minutes  Orthotic Management and Trainin minutes  Can add/delete any of these

## 2025-05-15 NOTE — DISCHARGE SUMMARY
[facility]  Discharge Summary    Charlotte Avalos Patient Status:  Outpatient in a Bed    1970 MRN [id number]   Location [facility] 3SW-A Attending Ramon Bhakta MD   Hosp Day # 0 PCP Aidan Bourgeois MD     Date of Admission: 2025    Date of Discharge: 5/15/25    Admitting Diagnosis: Cervical radiculopathy at C8 [M54.12]    Discharge Diagnosis: Problem List[1] Cervical radiculopathy at C8 [M54.12]    Reason for Admission: Cervical radiculopathy    Procedures: LEFT POSTERIOR CERVICAL 7 -THORACIC 1 FORAMINOTOMY     Hospital Course: 54-year-old female with left-sided C8 radiculopathy with pain radiating down her left arm. She tried PT without relief. She was found on cervical MRI to have C7-T1 broad based left foraminal protrusion with moderate left sided foraminal stenosis. She underwent left posterior cervical C7-T1 foraminotomy with Dr. Bhakta on   without complication. She was admitted to [facility] overnight. Her pain was well controlled, she was able to void and she was able to ambulate. She reported improvement of her left arm pain post-op. She was deemed stable for discharge home on 5/15/25.    Complications: None    Discharge Condition: Stable    Disposition: Home    Discharge Medications:   Current Discharge Medication List        START taking these medications    Details   docusate sodium 100 MG Oral Cap Take 100 mg by mouth 2 (two) times daily.  Qty: 30 capsule, Refills: 0    Associated Diagnoses: Cervical radiculopathy at C8      HYDROcodone-acetaminophen 5-325 MG Oral Tab Take 1-2 tablets by mouth every 6 (six) hours as needed.  Qty: 30 tablet, Refills: 0    Associated Diagnoses: Cervical radiculopathy at C8      methocarbamol 500 MG Oral Tab Take 1 tablet (500 mg total) by mouth 3 (three) times daily.  Qty: 30 tablet, Refills: 0    Associated Diagnoses: Cervical radiculopathy at C8           CONTINUE these medications which have NOT CHANGED    Details   buPROPion   MG Oral Tablet 24 Hr Take 1 tablet (300 mg total) by mouth daily.  Qty: 90 tablet, Refills: 1      cetirizine 10 MG Oral Tab Take 1 tablet (10 mg total) by mouth in the morning.      triamcinolone 0.1 % External Cream Apply ot affected skin 2 times a day for 14 days max  Qty: 60 g, Refills: 3      gabapentin 300 MG Oral Cap Take 1 capsule (300 mg total) by mouth 3 (three) times daily.  Qty: 90 capsule, Refills: 2    Associated Diagnoses: Cervical radiculopathy at C8      pantoprazole 40 MG Oral Tab EC Take 1 tablet (40 mg total) by mouth in the morning.      Varenicline Tartrate, Starter, (CHANTIX STARTING MONTH PAK) 0.5 MG X 11 & 1 MG X 42 Oral Tablet Therapy Pack Take 1 Dose by mouth As Directed. By starter pack  Qty: 1 each, Refills: 0    Associated Diagnoses: Smoker      Omega-3 Fatty Acids (FISH OIL OR) Take 1 capsule by mouth daily.             Follow up Visits:   Future Appointments   Date Time Provider Department Center   5/27/2025 11:30 AM Jessica Enrique APRN [facility]   6/26/2025 11:30 AM Ramon Bhakta MD [facility]         Patient Instructions:  Activity: No heavy lifting, bending or twisting. No driving while taking narcotic pain medications  Wound Care: keep wound clean and dry    Yvonne Das PA-C  [facility]  5/15/2025 7:30 AM           [1]   Patient Active Problem List  Diagnosis    Lesion of ulnar nerve     GLOBAL EXP / 06/30/2014 / LEFT WRIST / [facility]    Disorder of bursae and tendons in shoulder region    Chronic GERD    Anxiety    Essential hypertension    Cervical radiculopathy at C8    Pre-op testing

## 2025-05-15 NOTE — PROGRESS NOTES
0748: Pt is having concerns with pain control and going home today. She states that norco has never really worked well in the past for her. Message sent to Dr Bhakta and Yvonne BLANCAS to ask about possibly discontinuing the norco and adding scheduled tylenol 1000 mg po q 8 hrs and oxycodone 5 mg po q 4 hrs prn pain. Awaiting response.     0752:  Spoke to Yvonne. Orders changed. See MAR.

## 2025-05-19 RX ORDER — PANTOPRAZOLE SODIUM 40 MG/1
40 TABLET, DELAYED RELEASE ORAL DAILY
Qty: 90 TABLET | Refills: 0 | OUTPATIENT
Start: 2025-05-19

## 2025-05-19 RX ORDER — PANTOPRAZOLE SODIUM 40 MG/1
40 TABLET, DELAYED RELEASE ORAL DAILY
Qty: 30 TABLET | Refills: 0 | Status: SHIPPED | OUTPATIENT
Start: 2025-05-19

## 2025-05-21 DIAGNOSIS — M54.12 CERVICAL RADICULOPATHY AT C8: ICD-10-CM

## 2025-05-23 ENCOUNTER — PATIENT MESSAGE (OUTPATIENT)
Dept: SURGERY | Facility: CLINIC | Age: 55
End: 2025-05-23

## 2025-05-23 RX ORDER — OXYCODONE HYDROCHLORIDE 5 MG/1
TABLET ORAL EVERY 6 HOURS PRN
Qty: 30 TABLET | Refills: 0 | Status: SHIPPED | OUTPATIENT
Start: 2025-05-23

## 2025-05-23 RX ORDER — METHOCARBAMOL 500 MG/1
500 TABLET, FILM COATED ORAL 3 TIMES DAILY
Qty: 30 TABLET | Refills: 0 | Status: SHIPPED | OUTPATIENT
Start: 2025-05-23

## 2025-05-23 NOTE — TELEPHONE ENCOUNTER
Medication: methocarbamol     Date of last refill: 5/15 (#30/0)  [place]      Last office visit: sx 5/14/25  Due back to clinic per last office note:  5/27/25  Date next office visit scheduled:    Future Appointments   Date Time Provider Department Center   5/27/2025 11:30 AM Jessica Enrique APRN [facility]   6/26/2025 11:30 AM Ramon Bhakta MD [facility]            Medication: oxycodone 5mg     Date of last refill: 5/15/25 (#30/0)  [place]15/25     Last office visit: sx 5/14/25  Due back to clinic per last office note:  5/27/25  Date next office visit scheduled:    Future Appointments   Date Time Provider Department Center   5/27/2025 11:30 AM Jessica Enrique APRN [facility]   6/26/2025 11:30 AM Ramon Bhakta MD [facility]

## 2025-05-27 ENCOUNTER — OFFICE VISIT (OUTPATIENT)
Dept: SURGERY | Facility: CLINIC | Age: 55
End: 2025-05-27
Payer: COMMERCIAL

## 2025-05-27 VITALS
SYSTOLIC BLOOD PRESSURE: 116 MMHG | WEIGHT: 180 LBS | OXYGEN SATURATION: 99 % | HEIGHT: 64 IN | HEART RATE: 98 BPM | BODY MASS INDEX: 30.73 KG/M2 | TEMPERATURE: 98 F | DIASTOLIC BLOOD PRESSURE: 74 MMHG

## 2025-05-27 DIAGNOSIS — M54.12 CERVICAL RADICULOPATHY AT C8: Primary | ICD-10-CM

## 2025-05-27 PROCEDURE — 99024 POSTOP FOLLOW-UP VISIT: CPT

## 2025-05-27 NOTE — PROGRESS NOTES
Established patient:  Reason for follow up:   2 week post op, sx 05/14/25  Pt reports shoulder and arm pain (R side is worse)    Numeric Rating Scale:         Pain at Present:  7/10

## 2025-05-27 NOTE — PROGRESS NOTES
[facility]  Neurosurgery Clinic Visit: Post-Op Follow Up  May 27, 2025     Charlotte Avalos PCP: Aidan Bourgeois MD    1970 MRN [id number]     REASON FOR VISIT: 2 week post-op follow up    SUBJECTIVE     Charlotte Avalos is a pleasant 54 year old female here for follow up s/p left posterior C7-T1 foraminotomy with Dr. Bhakta on 25. Charlotte reports significant pain to both shoulders, radiating to the scapular region. She also endorses intermittent, sharp pain to R scapula. She is taking 1 tablet Oxycodone every 6 hours, with little relief. She is taking Robaxin as prescribed. Denies any new pain, paresthesias or weakness. Denies incisional issues.     MEDICAL HISTORY     Past Medical History[1]   Past Surgical History[2]   Family History[3]   Social Hx on file[4]   Allergies[5]     MEDICATIONS     Current Medications[6]    REVIEW OF SYSTEMS     Denies fever, chills, shortness of breath, chest pain, or calf pain.     PHYSICAL EXAMINATION     VITALS: LMP 2024     GENERAL: No acute distress, non-toxic appearing    MUSCULOSKELETAL: Moves all extremities freely.    SKIN: Warm, dry. Posterior cervical incision is approximated with glue. Incision is clean, dry and intact without signs of drainage, edema, or erythema.     NEURO: Alert and oriented x3.  Face is symmetric.  Comprehension intact.     SPINE:  GAIT/COORDINATION:  Intact, non-antalgic.     SENSATION:  Intact to light touch to bilateral upper and lower extremities.    LOWER EXTREMITY STRENGTH:    Iliospoas  Hamstrings  Quads  D-flexion  P-flexion EHL     Right 5 5 5 5 5 5     Left 5 5 5 5 5 5     UPPER EXTREMITY STRENGTH:    Deltoid  Biceps  Triceps  Wrist   Flexion  Wrist Extension    Finger abduction     Right 5 5 5 5  5 5 5     Left 5 5 5 5 5 5 5     IMAGING     No new imaging to review    ASSESSMENT AND PLAN     ASSESSMENT:   1. Cervical radiculopathy at C8 s/p left posterior C7-T1 foraminotomy with Dr. Bhakta on  5/14/25       PLAN:   Advised patient that she may take 1-2 tablets of Oxycodone depending on severity of pain  Okay to use heating pads as needed for shoulder/scapular pain. Advised to avoid direct contact with heat over surgical incision.  Continue to wean narcotics as able  No driving or operating machinery while taking narcotics  No lifting greater than 10 lbs until next appointment  Reviewed activity restrictions and incisional care  F/U in 4 weeks with Dr. Livia Enrique, MSN, APRN, FNP-[facility]  Neurosurgery   [address] [place] [address], [address]  [place] [address]  [phone]          [1]   Past Medical History:   Esophageal reflux    Essential hypertension    PONV (postoperative nausea and vomiting)   [2]   Past Surgical History:  Procedure Laterality Date    Back surgery  01/01/2013    cer fusion revision     Other surgical history  2003    C5-C6 Fusion    Other surgical history  2007    C6-C7 FUSION    Spinal fusion  2002    C5 -c 6 FUSION   [3]   Family History  Problem Relation Age of Onset    Dementia Mother     Diabetes Father     Diabetes Paternal Grandmother     Diabetes Paternal Grandfather    [4]   Social History  Socioeconomic History    Marital status:    Tobacco Use    Smoking status: Some Days     Current packs/day: 0.50     Types: Cigarettes     Passive exposure: Never    Smokeless tobacco: Never    Tobacco comments:     Will start wellbutrin   Vaping Use    Vaping status: Never Used   Substance and Sexual Activity    Alcohol use: Yes     Comment: socially    Drug use: Not Currently     Types: Cannabis     Comment: gummies occ - has not used since 2/2025   Other Topics Concern    Caffeine Concern No    Exercise No   [5] No Known Allergies  [6]   No outpatient medications have been marked as taking for the 5/27/25 encounter (Appointment) with Jessica Enrique APRN.

## 2025-05-28 ENCOUNTER — TELEPHONE (OUTPATIENT)
Dept: SURGERY | Facility: CLINIC | Age: 55
End: 2025-05-28

## 2025-05-29 DIAGNOSIS — M54.12 CERVICAL RADICULOPATHY AT C8: ICD-10-CM

## 2025-05-29 RX ORDER — GABAPENTIN 300 MG/1
300 CAPSULE ORAL 3 TIMES DAILY
Qty: 90 CAPSULE | Refills: 2 | Status: SHIPPED | OUTPATIENT
Start: 2025-05-29

## 2025-05-29 NOTE — TELEPHONE ENCOUNTER
Medication: Oxycodone 5mg     Date of last refill: 5.23.25 (#30/0)  [place]     Last office visit: 5.27.25  Due back to clinic per last office note:  4 weeks   Date next office visit scheduled:    Future Appointments   Date Time Provider Department Center   6/26/2025 11:30 AM Ramon Bhakta MD [facility]        Last OV note recommendation:    \"Advised patient that she may take 1-2 tablets of Oxycodone depending on severity of pain  Okay to use heating pads as needed for shoulder/scapular pain. Advised to avoid direct contact with heat over surgical incision.  Continue to wean narcotics as able  No driving or operating machinery while taking narcotics  No lifting greater than 10 lbs until next appointment  Reviewed activity restrictions and incisional care  F/U in 4 weeks with Dr. Bhakta \"

## 2025-05-29 NOTE — TELEPHONE ENCOUNTER
Medication: Docusate sodium 100 mg      Date of last refill: 5.15.25 (#30/0)  [place]     Last office visit: 5.27.25  Due back to clinic per last office note:  4 weeks  Date next office visit scheduled:    Future Appointments   Date Time Provider Department Center   6/26/2025 11:30 AM Ramon Bhakta MD [facility]           Last OV note recommendation:    \"Advised patient that she may take 1-2 tablets of Oxycodone depending on severity of pain  Okay to use heating pads as needed for shoulder/scapular pain. Advised to avoid direct contact with heat over surgical incision.  Continue to wean narcotics as able  No driving or operating machinery while taking narcotics  No lifting greater than 10 lbs until next appointment  Reviewed activity restrictions and incisional care  F/U in 4 weeks with Dr. Bhakta \"

## 2025-06-02 RX ORDER — OXYCODONE HYDROCHLORIDE 5 MG/1
TABLET ORAL EVERY 6 HOURS PRN
Qty: 30 TABLET | Refills: 0 | Status: SHIPPED | OUTPATIENT
Start: 2025-06-02

## 2025-06-02 RX ORDER — PSEUDOEPHEDRINE HCL 30 MG
100 TABLET ORAL 2 TIMES DAILY
Qty: 30 CAPSULE | Refills: 0 | Status: SHIPPED | OUTPATIENT
Start: 2025-06-02

## 2025-06-10 ENCOUNTER — TELEPHONE (OUTPATIENT)
Dept: FAMILY MEDICINE CLINIC | Facility: CLINIC | Age: 55
End: 2025-06-10

## 2025-06-10 DIAGNOSIS — M54.12 CERVICAL RADICULOPATHY AT C8: ICD-10-CM

## 2025-06-10 NOTE — TELEPHONE ENCOUNTER
Medication: oxyCODONE 5 MG Oral Tab      Date of last refill: 6.2.25 (#30/0)  [place]25     Last office visit: 5.27.25  Due back to clinic per last office note:  4 weeks  Date next office visit scheduled:    Future Appointments   Date Time Provider Department Center   6/26/2025 11:15 AM Ramon Bhakta MD [facility]           Last OV note recommendation:   \"ASSESSMENT:   1. Cervical radiculopathy at C8 s/p left posterior C7-T1 foraminotomy with Dr. Bhakta on 5/14/25       PLAN:   Advised patient that she may take 1-2 tablets of Oxycodone depending on severity of pain  Okay to use heating pads as needed for shoulder/scapular pain. Advised to avoid direct contact with heat over surgical incision.  Continue to wean narcotics as able  No driving or operating machinery while taking narcotics  No lifting greater than 10 lbs until next appointment  Reviewed activity restrictions and incisional care  F/U in 4 weeks with Dr. Bhakta \"

## 2025-06-11 RX ORDER — METHOCARBAMOL 500 MG/1
500 TABLET, FILM COATED ORAL 3 TIMES DAILY
Qty: 30 TABLET | Refills: 0 | Status: SHIPPED | OUTPATIENT
Start: 2025-06-11

## 2025-06-11 NOTE — TELEPHONE ENCOUNTER
Medication:   methocarbamol 500 MG Oral Tab 30 tablet 0 5/23/2025 --   Sig:   Take 1 tablet (500 mg total) by mouth 3 (three) times daily.          Date of last refill: 5.23.2025 (#30/0)  [place]      Last office visit: 5.27.2025  Due back to clinic per last office note:  4 weeks   Date next office visit scheduled:  6.26.2025  Future Appointments   Date Time Provider Department Center   6/26/2025 11:15 AM Ramon Bhakta MD [facility]           Last OV note recommendation:    \" PLAN:   Advised patient that she may take 1-2 tablets of Oxycodone depending on severity of pain  Okay to use heating pads as needed for shoulder/scapular pain. Advised to avoid direct contact with heat over surgical incision.  Continue to wean narcotics as able  No driving or operating machinery while taking narcotics  No lifting greater than 10 lbs until next appointment  Reviewed activity restrictions and incisional care  F/U in 4 weeks with Dr. Bhakta  \"

## 2025-06-12 RX ORDER — OXYCODONE HYDROCHLORIDE 5 MG/1
TABLET ORAL EVERY 8 HOURS PRN
Qty: 25 TABLET | Refills: 0 | Status: SHIPPED | OUTPATIENT
Start: 2025-06-12

## 2025-06-12 NOTE — TELEPHONE ENCOUNTER
Refill provided. In an effort to start to gradually wean this medication, frequency has been changed from q6h PRN to q8h PRN.

## 2025-06-12 NOTE — TELEPHONE ENCOUNTER
Chief Complaint   Patient presents with    Establish Care    Requesting Labs                                                                                                                                       HPI:   Nery presents today with the following.    Patient consented to the use of [employer] to record and transcribe notes during this visit.      The patient is here today for establishment of care and routine health maintenance. The patient has switched from [facility] to [facility] for convenience, citing access to the [employer]elia.    The patient reports no current medical concerns. She has a history of appendectomy in her early 20s and a  in 2023. A ParaGard IUD was placed in 2023. The patient takes fish oil supplements daily.    Regarding substance use, the patient currently vapes nicotine, with one disposable device lasting approximately two weeks. She formerly smoked cigarettes from age 17 to around 2020, consuming about one pack per week. The patient reports occasional alcohol use and past marijuana use.    The patient's last labs showed borderline high triglycerides (160 mg/dL). She has expressed interest in basic labs including cholesterol screening.    The patient's medical history includes borderline high triglycerides. Her surgical history comprises a  in 2023 and an appendectomy at age 22 (approximately ).    The patient's family history includes a maternal aunt with breast cancer (BRCA negative) and a mother monitored for atherosclerosis.    In terms of social history, the patient has a daughter who will turn 2 on 2025. The patient was born in [place].    Regarding immunizations, the patient believes she received the Hepatitis B vaccine and possibly the MMR vaccine in the hospital, potentially post-childbirth. She received the Tdap vaccine in 2020, possibly during pregnancy.        ROS:  All systems negative expect as addressed  Noted message below from provider.   \"Refill provided. In an effort to start to gradually wean this medication, frequency has been changed from q6h PRN to q8h PRN.\"    Noted patient is active on MyChart, MyChart message sent to patient to inform of change in directions on how often she can take her medication.       "in assessment and plan.     /70 (BP Location: Left arm, Patient Position: Sitting)   Pulse 96   Temp 36.5 °C (97.7 °F) (Temporal)   Resp 16   Ht 1.549 m (5' 1\")   Wt 89.4 kg (197 lb)   LMP 04/17/2025 (Exact Date)   SpO2 95%   Breastfeeding No   BMI 37.22 kg/m²     Objective:    Physical Exam  Vitals reviewed.   Constitutional:       Appearance: Normal appearance.   HENT:      Head: Normocephalic and atraumatic.      Mouth/Throat:      Mouth: Mucous membranes are moist.   Eyes:      Extraocular Movements: Extraocular movements intact.      Conjunctiva/sclera: Conjunctivae normal.   Pulmonary:      Effort: Pulmonary effort is normal.   Musculoskeletal:         General: Normal range of motion.      Cervical back: Normal range of motion.   Skin:     General: Skin is warm and dry.   Neurological:      General: No focal deficit present.      Mental Status: She is alert and oriented to person, place, and time.   Psychiatric:         Mood and Affect: Mood normal.         Behavior: Behavior normal.         Thought Content: Thought content normal.         Assessment and Plan:  32 y.o. female with the following issues.    Assessment & Plan  Dyslipidemia  Chronic.     Patient reports mildly elevated LDL and elevated triglycerides on her labs last year.     Mom with a history of coronary atherosclerosis.     -Will recheck lipid panel.   Screening for thyroid disorder    Encounter for screening for lipid disorder    Encounter for screening for HIV    Encounter for hepatitis C virus screening test for high risk patient    Vitamin D deficiency    Well adult exam    Gestational proteinuria in third trimester  This was noted in her labs from L&D admission.     - Will recheck urine.   Nicotine dependence due to vaping tobacco product    Elevated alkaline phosphatase level  This was noted in 9/2023.     - Will recheck CMP.         Nicotine Dependence:     - Assessment: Current nicotine vaper, transitioned from cigarettes " (started age 17 in 2010, quit 3-4 years ago). Uses disposable vapes, 1 device/2 weeks. Previous use: ~1 pack/week.     - Plan:  on nicotine risks. Encourage cessation. Discuss NRT options at follow-up if interested in quitting.    Routine Health Maintenance:     - Assessment: Due for screening. FHx: breast cancer (maternal aunt, BRCA-), atherosclerosis (mother). Previous labs: borderline high triglycerides (160 mg/dL).     - Plan: Order fasting CMP and lipid panel. Labs at [facility] (M-F 6AM-4PM, Sat 7AM-2PM). Fast 8-10h, water allowed. Review results, contact if abnormal. 3-month follow-up to discuss results. Recommend annual check-ups.    Immunization Status:     - Assessment: Incomplete records. Tdap (2020) deleted. She believes she received Hep B in childhood, MMR and Tdap during pregnancy.     - Plan: Request records from previous PCP (Daphne Chou, [facility]). Request OB records from [facility]. She is to send available vaccine records via [employer]. Update immunization record upon receipt.        Return in about 3 months (around 7/24/2025) for Lab Review.      Please note that this dictation was created using voice recognition software. I have worked with consultants from the vendor as well as technical experts from [facility] to optimize the interface. I have made every reasonable attempt to correct obvious errors, but I expect that there are errors of grammar and possibly content that I did not discover before finalizing the note.

## 2025-06-20 RX ORDER — PANTOPRAZOLE SODIUM 40 MG/1
40 TABLET, DELAYED RELEASE ORAL DAILY
Qty: 30 TABLET | Refills: 0 | Status: SHIPPED | OUTPATIENT
Start: 2025-06-20

## 2025-06-20 NOTE — TELEPHONE ENCOUNTER
Gastrointestional Medication Protocol Passed06/20/2025 12:22 AM   Protocol Details In person appointment or virtual visit in the past 12 mos or appointment in next 3 mos    Medication is active on med list

## 2025-06-26 ENCOUNTER — OFFICE VISIT (OUTPATIENT)
Dept: SURGERY | Facility: CLINIC | Age: 55
End: 2025-06-26
Payer: COMMERCIAL

## 2025-06-26 ENCOUNTER — PATIENT MESSAGE (OUTPATIENT)
Dept: SURGERY | Facility: CLINIC | Age: 55
End: 2025-06-26

## 2025-06-26 VITALS
SYSTOLIC BLOOD PRESSURE: 110 MMHG | HEIGHT: 64 IN | HEART RATE: 100 BPM | WEIGHT: 180 LBS | BODY MASS INDEX: 30.73 KG/M2 | DIASTOLIC BLOOD PRESSURE: 70 MMHG

## 2025-06-26 DIAGNOSIS — M54.12 CERVICAL RADICULOPATHY AT C8: Primary | ICD-10-CM

## 2025-06-26 PROCEDURE — 99024 POSTOP FOLLOW-UP VISIT: CPT | Performed by: NEUROLOGICAL SURGERY

## 2025-06-26 RX ORDER — CYCLOBENZAPRINE HCL 10 MG
10 TABLET ORAL 3 TIMES DAILY PRN
Qty: 60 TABLET | Refills: 1 | Status: SHIPPED | OUTPATIENT
Start: 2025-06-26

## 2025-06-26 RX ORDER — OXYCODONE HYDROCHLORIDE 5 MG/1
TABLET ORAL EVERY 8 HOURS PRN
Qty: 25 TABLET | Refills: 0 | Status: SHIPPED | OUTPATIENT
Start: 2025-06-26

## 2025-06-26 NOTE — PATIENT INSTRUCTIONS
Refill policies:    Allow 2-3 business days for refills; controlled substances may take longer.  Contact your pharmacy at least 5 days prior to running out of medication and have them send an electronic request or submit request through the “request refill” option in your [employer] account.  Refills are not addressed on weekends; covering physicians do not authorize routine medications on weekends.  No narcotics or controlled substances are refilled after noon on Fridays or by on call physicians.  By law, narcotics must be electronically prescribed.  A 30 day supply with no refills is the maximum allowed.  If your prescription is due for a refill, you may be due for a follow up appointment.  To best provide you care, patients receiving routine medications need to be seen at least once a year.  Patients receiving narcotic/controlled substance medications need to be seen at least once every 3 months.  In the event that your preferred pharmacy does not have the requested medication in stock (e.g. Backordered), it is your responsibility to find another pharmacy that has the requested medication available.  We will gladly send a new prescription to that pharmacy at your request.    Scheduling Tests:    If your physician has ordered radiology tests such as MRI or CT scans, please contact Central Scheduling at [phone] right away to schedule the test.  Once scheduled, the [facility] Centralized Referral Team will work with your insurance carrier to obtain pre-certification or prior authorization.  Depending on your insurance carrier, approval may take 3-10 days.  It is highly recommended patients assure they have received an authorization before having a test performed.  If test is done without insurance authorization, patient may be responsible for the entire amount billed.      Precertification and Prior Authorizations:  If your physician has recommended that you have a procedure or additional testing performed the [facility]  Centralized Referral Team will contact your insurance carrier to obtain pre-certification or prior authorization.    You are strongly encouraged to contact your insurance carrier to verify that your procedure/test has been approved and is a COVERED benefit.  Although the [facility] Centralized Referral Team does its due diligence, the insurance carrier gives the disclaimer that \"Although the procedure is authorized, this does not guarantee payment.\"    Ultimately the patient is responsible for payment.   Thank you for your understanding in this matter.  Paperwork Completion:  If you require FMLA or disability paperwork for your recovery, please make sure to either drop it off or have it faxed to our office at [phone]. Be sure the form has your name and date of birth on it.  The form will be faxed to our Forms Department and they will complete it for you.  There is a 25$ fee for all forms that need to be filled out.  Please be aware there is a 10-14 day turnaround time.  You will need to sign a release of information (NASEEM) form if your paperwork does not come with one.  You may call the Forms Department with any questions at [phone].  Their fax number is [phone].

## 2025-06-26 NOTE — PROGRESS NOTES
Established patient:  Reason for follow up: 6 week post op LEFT POSTERIOR CERVICAL 7 -THORACIC 1 FORAMINOTOMY     Numeric Rating Scale:   Pain at Present:  6/10       Distribution of Pain:    left states she has a lot of pain in her shoulders and she will get pain down the left arm     Most recent treatments for Current Pain Condition:   Surgery    Response to treatment: some relief

## 2025-06-26 NOTE — TELEPHONE ENCOUNTER
Called patient, no answer. Left voicemail with call back number. Sent [employer] message asking for additional information.     Letter pended, awaiting further information for letter.

## 2025-06-26 NOTE — PROGRESS NOTES
Neurosurgery Clinic Visit  2025    Charlotte Avalos PCP:  Aidan Bourgeois MD    1970 MRN [id number]     HISTORY OF PRESENT ILLNESS:  Charlotte Avalos is a(n) 54 year old female presents for follow-up status post left C7-T1 foraminotomies  Her left arm pain is greatly improved  She has occasional tingling when she moves her head in a certain position but otherwise arm pain is much improved  Still having some pain in her left trap as well as right trap  Little worse on the right side  She is here for follow-up      PHYSICAL EXAMINATION:  Vital Signs:  /70 (BP Location: Right arm, Patient Position: Sitting, Cuff Size: large)   Pulse 100   Ht 64\"   Wt 180 lb (81.6 kg)   LMP 2024   BMI 30.90 kg/m²   Awake alert, oriented x 3  Follows commands x 4  Her right trap is definitely tighter than her left  Incision well-healed      REVIEW OF STUDIES:    Preoperative MRI reviewed      ASSESSMENT and PLAN:  52-year-old female status post left C7/T1 foraminotomies  Pressure standpoint she is doing really well  I cannot explain her right trap being a little tight  But she is 6 weeks out and restrictions are lifted  Will start some physical therapy which was ordered  I gave her the muscle laxer  Did refill her OxyContin  We also discussed other modalities as well as massage, dry needling, cupping, also possibly trigger point injections in the future with pain management  Will see her back in 6 weeks and see how she is doing  All questions were answered  Patient and  appreciative        Time spent on counseling/coordination of care:  15 Minutes    Total time spent with patient:  15 Minutes      Ramon Bhakta MD   [facility]  2025  11:41 AM   Dictated but not proofread

## 2025-06-27 NOTE — TELEPHONE ENCOUNTER
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 Patient needs a note for work stating she should be off until re-evaluated after physical therapy.     Letter pended and routed to provider.

## 2025-07-01 NOTE — TELEPHONE ENCOUNTER
Letter sent to patient. This is our standard pre populated letter for all patients. Mychart sent to patient explaining.

## 2025-07-09 ENCOUNTER — TELEPHONE (OUTPATIENT)
Dept: PHYSICAL THERAPY | Facility: HOSPITAL | Age: 55
End: 2025-07-09

## 2025-07-10 ENCOUNTER — OFFICE VISIT (OUTPATIENT)
Dept: PHYSICAL THERAPY | Age: 55
End: 2025-07-10
Attending: NEUROLOGICAL SURGERY
Payer: COMMERCIAL

## 2025-07-10 DIAGNOSIS — M54.12 CERVICAL RADICULOPATHY AT C8: Primary | ICD-10-CM

## 2025-07-10 PROCEDURE — 97110 THERAPEUTIC EXERCISES: CPT

## 2025-07-10 PROCEDURE — 97161 PT EVAL LOW COMPLEX 20 MIN: CPT

## 2025-07-10 PROCEDURE — 97140 MANUAL THERAPY 1/> REGIONS: CPT

## 2025-07-10 NOTE — PROGRESS NOTES
SPINE EVALUATION:     Diagnosis:   Cervical radiculopathy at C8 (M54.12) Patient:  Charltote Avalos (54 year old, female)        Referring Provider: Ramon Bhakta  Today's Date   7/10/2025    Precautions:  None   Date of Evaluation: 07/10/25  Next MD visit: 8/7/25  Date of Surgery: 5/14/25     PATIENT SUMMARY     Summary of chief complaints: neck pain and radicular symptoms after neck surgery  History of current condition: The patient reports that she doesn't have the sharp pain in her neck that she used to have, but she has noticed that she started having pain in her shoulder and tingling in her left arm this past week. She reports no aggravating incident and reports that it happens sporadicaly. The patient drove for the first time today and she reports some increased soreness from having to turn to her head. She reports that the muscles on the back of her neck are tight, worse on the right than the left. \"Most of the pain was on my right side because I'm right handed. In the beginning, I had horrible pain in my right shoulder blade, so I couldn't feed my dogs. I couldn't bend down to  the bowls or scoop the food. That's better, but picking stuff up off the floor is still difficult.\"   Pain level: current 0 /10, at best 0 /10, at worst 7 /10  Description of symptoms: sharp pain that comes and goes sporadically, decreasing frequency; tightness in the neck and scapular musculature   Occupation: [employer], work from home; currently not back to work yet   Leisure activities/Hobbies: None   Prior level of function: Patient was able to complete all ADL's without limitations.  Current limitations: sitting (15-20 minutes), typing for work, lifting, pushing, pulling, bending over, driving, sleeping (prefers to sleep on her stomach)  Pt goals: \"Pain relief in trap muscles and relief of tingling in left hand\"  Past medical history was reviewed with Charlotte.  Significant findings include: four previous neck  surgeries  Imaging/Tests: MRI 9/2024   Charlotte  has a past medical history of Esophageal reflux, Essential hypertension, and PONV (postoperative nausea and vomiting).  She  has a past surgical history that includes spinal fusion (2002); back surgery (01/01/2013); other surgical history (2003); other surgical history (2007); and cervical spine surgery (05/14/2025).    ASSESSMENT  Charlotte presents to physical therapy evaluation with primary c/o neck pain and radicular symptoms after neck surgery. The results of the objective tests and measures show anticipated post-surgical deficits in neck mobility and postural strength and endurance deficits after 7-T1 foraminotomy 5/14/25. The patient does note new onset of leftt-sided radicular symptoms that come and go sporadically wthout consistent aggravating factors. Mechanical and provocative testing showed more limitations in mobility and radicular symptoms on the left side. Functional deficits include but are not limited to sitting (15-20 minutes), typing for work, lifting, pushing, pulling, bending over, driving, sleeping (prefers to sleep on her stomach). Signs and symptoms are consistent with diagnosis of Cervical radiculopathy at C8 (M54.12). Pt and PT discussed evaluation findings, pathology, POC and HEP.  Pt voiced understanding and performs HEP correctly without reported pain. Skilled Physical Therapy is medically necessary to address the above impairments and reach functional goals.    OBJECTIVE:      Musculoskeletal:  Observation/Posture: forward head posture; rounded shoulders; posterior pelvic tilt; increased thoracic kyphosis   Palpation: Patient is with increased left-sided upper trap and neck muscular tone.     ROM and Strength:  (* denotes performed with pain)   Cervical ROM     Flex 38*     Ext 36    R L     Side bend 20* 12*     Rotation 30 28   ,   Myotome MMT   MMT (-/5)    R L   Shoulder Abd (C5) 4+/5 4/5   Elbow Ext (C7) 5/5 4/5   Elbow Flex (C6) 5/5 5/5    Wrist Flex (C7) 5/5 5/5   Wrist Ext (C6) 5/5 5/5   Thumb Ext (C8) 5/5 5/5   Digit Flex (C8) 5/5 5/5     Hand  strength: (R) 32lb, (L) 28lb     Today's Treatment and Response:   Pt education was provided on exam findings, treatment diagnosis, treatment plan, expectations, and prognosis.    Today's Treatment       7/10/2025   Spine Treatment   Therapeutic Exercise Manual upper trap stretch: x 30\" (B)   Supine chin tuck: 2 x 10 x 3\"   Seated scap retraction: 2 x 10 x 3\"   Modified downward dog: 1 x 10 x 3\"   Provided patient with a written copy of exercise instruction which was also documented in patient's electronic medical chart. Educated the patient on the need for consistency with HEP to achieve the mutually established goals.  Reviewed individual insurance plan benefits/coverage and provided patient an appointment list, contact information for the clinic and treating providers and company attendance policy.      Manual Therapy Soft tissue mobilization (supine): (B) upper trap, levator, cervical paraspinals   Cervical traction: 3 x 30\"   SOR: x 3min   Cervical upglide joint mobs - C3-C7: Grade 3, 2 x 10\" each (B)    Therapeutic Activity Education on anatomy and pathophysiology of current condition, rationale for physical therapy, anticipated treatment interventions, prognosis, timeline for recovery, and expected functional outcomes based on evaluative findings.    Therapeutic Exercise Minutes 10   Manual Therapy Minutes 15   Therapeutic Activity Minutes 5   Evaluation Minutes 15   Total Time Of Timed Procedures 30   Total Time Of Service-Based Procedures 15   Total Treatment Time 45   HEP Access Code: [email or web address]  URL: [email or web address]  Date: 07/10/2025  Prepared by: Natalie Flores    Exercises  - Supine Chin Tuck  - 1 x daily - 7 x weekly - 3 sets - 10 reps  - Seated Scapular Retraction  - 1 x daily - 7 x weekly - 2 sets - 10 reps - 2-3\" hold  - Standing Lumbar Spine Flexion Stretch  Counter  - 1 x daily - 7 x weekly - 1 sets - 10 reps - 2-3\" hold        Patient was instructed in and issued a HEP for: Access Code: [email or web address]  URL: [email or web address]  Date: 07/10/2025  Prepared by: Natalie Flores    Exercises  - Supine Chin Tuck  - 1 x daily - 7 x weekly - 3 sets - 10 reps  - Seated Scapular Retraction  - 1 x daily - 7 x weekly - 2 sets - 10 reps - 2-3\" hold  - Standing Lumbar Spine Flexion Stretch Counter  - 1 x daily - 7 x weekly - 1 sets - 10 reps - 2-3\" hold    Charges:  PT EVAL: Low Complexity, MT x1, TE x 1  In agreement with evaluation findings and clinical rationale, this evaluation involved LOW COMPLEXITY decision making due to no personal factors/comorbidities, 1-2 body structures involved/activity limitations, and stable symptoms as documented in the evaluation.                                                                         PLAN OF CARE:      Goals: (to be met in 12 visits)   Short-Term Goals:  The patient will improve cervical flexion AROM to 50deg pain-free to facilitate looking up for completing household chores that require looking down such as cooking, washing dishes, etc.. Timeframe: 6 visits.  The patient will improve cervical rotation AROM to 50deg (B) pain-free to facilitate mobility for turning his/her head to check blindspot while driving. Timeframe: 6-8 visits.  The patient will improve cervical extension AROM to 50 deg pain-free to facilitate looking up for visualization while reaching overhead into upper cabinets. Timeframe: 6-8 visits.  Long-Term Goals:  Patient will improve cervical neck pain and headache symptoms to facilitate sleeping at least at least 6 hours without interruption due to neck pain for adequate rest. Timeframe: 10-12 visits.  Patient will improve thoracic spine mobility and postural endurance to maintain proper posture with neutral head position while sitting to facilitate 3 hours of pain-free sitting or deskwork.  Timeframe: 12 visits.  Patient will improve Neck Disability Index (NDI) score by 10 points or 10% to indicate a true change in improved function for ADL's and restoring PLF. Timeframe: 12 visits.       Frequency / Duration: Patient will be seen 2x/week or a total of 12  visits over a 90 day period. Treatment will include: Manual Therapy; Neuromuscular Re-education; Self-Care Home Management; Therapeutic Activities; Therapeutic Exercise; Home Exercise Program instruction; Electrical stimulation (unattended)    Education or treatment limitation: None   Rehab Potential: good     Neck Disability Index Score  Score: (Patient-Rptd) 62 % (7/9/2025 10:40 AM)      Patient/Family/Caregiver was advised of these findings, precautions, and treatment options and has agreed to actively participate in planning and for this course of care.    Thank you for your referral. Please co-sign or sign and return this letter via fax as soon as possible to [phone]. If you have any questions, please contact me at Dept: [phone]    Sincerely,  Electronically signed by therapist: Natalie Flores PT  Physician's certification required: Yes  I certify the need for these services furnished under this plan of treatment and while under my care.    X___________________________________________________ Date____________________    Certification From: 7/10/2025  To: 10/8/2025

## 2025-07-15 ENCOUNTER — OFFICE VISIT (OUTPATIENT)
Dept: PHYSICAL THERAPY | Age: 55
End: 2025-07-15
Attending: NEUROLOGICAL SURGERY
Payer: COMMERCIAL

## 2025-07-15 ENCOUNTER — APPOINTMENT (OUTPATIENT)
Dept: PHYSICAL THERAPY | Age: 55
End: 2025-07-15
Attending: NEUROLOGICAL SURGERY
Payer: COMMERCIAL

## 2025-07-15 PROCEDURE — 97140 MANUAL THERAPY 1/> REGIONS: CPT

## 2025-07-15 PROCEDURE — 97112 NEUROMUSCULAR REEDUCATION: CPT

## 2025-07-15 NOTE — PROGRESS NOTES
Patient: Charlotte Avalos (55 year old, female) Referring Provider:  Insurance:   Diagnosis: Cervical radiculopathy at C8 (M54.12) [employer]   Date of Surgery: 5/14/25 Next MD visit:  N/A   Precautions:  None 8/7/25 Referral Information:    Date of Evaluation: Req: 0, Auth: 0, Exp:     07/10/25 POC Auth Visits:          Today's Date   7/15/2025    Subjective  Patient reports since surgery she has not had the shooting pain down the left arm, but still with some tingling into her pinky of left hand. Patient reports procedures 1-3 went well and didn't have issues after, procedure 4 was horribly painful and rough recovery       Pain: 6/10     Objective       Assessment  Patient care this day focused on proper coorindation/activation of periscapular muscles without resistance this day, with significant weakness noted bilaterally, but more so on left (involved side). Patient was instructed through a series of isolating scapular depression, then scapular depression with retraction in different positions. Patient still unable to complete with proper mechanics when adding any amount of resistance (elastic band in supine).    Goals: (to be met in 12 visits)   Short-Term Goals:  The patient will improve cervical flexion AROM to 50deg pain-free to facilitate looking up for completing household chores that require looking down such as cooking, washing dishes, etc.. Timeframe: 6 visits.  The patient will improve cervical rotation AROM to 50deg (B) pain-free to facilitate mobility for turning his/her head to check blindspot while driving. Timeframe: 6-8 visits.  The patient will improve cervical extension AROM to 50 deg pain-free to facilitate looking up for visualization while reaching overhead into upper cabinets. Timeframe: 6-8 visits.  Long-Term Goals:  Patient will improve cervical neck pain and headache symptoms to facilitate sleeping at least at least 6 hours without interruption due to neck pain for  adequate rest. Timeframe: 10-12 visits.  Patient will improve thoracic spine mobility and postural endurance to maintain proper posture with neutral head position while sitting to facilitate 3 hours of pain-free sitting or deskwork. Timeframe: 12 visits.  Patient will improve Neck Disability Index (NDI) score by 10 points or 10% to indicate a true change in improved function for ADL's and restoring PLF. Timeframe: 12 visits.           Plan  Continue neuro re-ed for coorindation of periscapular muscles, left emphasis    Treatment Last 4 Visits  Treatment Day: 2       7/10/2025 7/15/2025   Spine Treatment   Therapeutic Exercise Manual upper trap stretch: x 30\" (B)   Supine chin tuck: 2 x 10 x 3\"   Seated scap retraction: 2 x 10 x 3\"   Modified downward dog: 1 x 10 x 3\"   Provided patient with a written copy of exercise instruction which was also documented in patient's electronic medical chart. Educated the patient on the need for consistency with HEP to achieve the mutually established goals.  Reviewed individual insurance plan benefits/coverage and provided patient an appointment list, contact information for the clinic and treating providers and company attendance policy.    Supine Elastic Band Pull Apart x 10; Yellow - hold future appointments, significant [place] compensation   Doorway Stretches, bicep stretches add next visit   Thoracic FR flexion, abduction next visit    Neuro Re-Education  Supine Scap Depression 2 x 10 3\" H   Seated Scap Depression x 10 3\" H   Seated Scap Depr--Retraction x 10 3\" H   Supine Scap Depression x 10 3\" H    Manual Therapy Soft tissue mobilization (supine): (B) upper trap, levator, cervical paraspinals   Cervical traction: 3 x 30\"   SOR: x 3min   Cervical upglide joint mobs - C3-C7: Grade 3, 2 x 10\" each (B)  PA Thoracic Mobs T4-T9 x 5 min   Cupping (Prone): Periscap (B)  UT Release (L) x 2 min      Therapeutic Activity Education on anatomy and pathophysiology of current condition,  rationale for physical therapy, anticipated treatment interventions, prognosis, timeline for recovery, and expected functional outcomes based on evaluative findings.     Therapeutic Exercise Minutes 10 5   Neuro Re-Educ Minutes  23   Manual Therapy Minutes 15 13   Therapeutic Activity Minutes 5    Evaluation Minutes 15    Total Time Of Timed Procedures 30 41   Total Time Of Service-Based Procedures 15 0   Total Treatment Time 45 41   HEP Access Code: [email or web address]  URL: [email or web address]  Date: 07/10/2025  Prepared by: Natalie Folres    Exercises  - Supine Chin Tuck  - 1 x daily - 7 x weekly - 3 sets - 10 reps  - Seated Scapular Retraction  - 1 x daily - 7 x weekly - 2 sets - 10 reps - 2-3\" hold  - Standing Lumbar Spine Flexion Stretch Counter  - 1 x daily - 7 x weekly - 1 sets - 10 reps - 2-3\" hold Access Code: [email or web address]  URL: [email or web address]  Date: 07/15/2025  Prepared by: Rissa Hal    Exercises  - Supine Chin Tuck  - 1 x daily - 7 x weekly - 3 sets - 10 reps  - Seated Scapular Retraction  - 2 x daily - 7 x weekly - 2 sets - 10 reps - 2-3\" hold  - Supine Scapular Depression  - 2 x daily - 7 x weekly - 2 sets - 10 reps - 2-3\" hold        HEP  Access Code: [email or web address]  URL: [email or web address]  Date: 07/15/2025  Prepared by: Rissa Glenn    Exercises  - Supine Chin Tuck  - 1 x daily - 7 x weekly - 3 sets - 10 reps  - Seated Scapular Retraction  - 2 x daily - 7 x weekly - 2 sets - 10 reps - 2-3\" hold  - Supine Scapular Depression  - 2 x daily - 7 x weekly - 2 sets - 10 reps - 2-3\" hold    Charges  Neuro x 2, Manual x 1

## 2025-07-17 ENCOUNTER — OFFICE VISIT (OUTPATIENT)
Dept: PHYSICAL THERAPY | Age: 55
End: 2025-07-17
Attending: NEUROLOGICAL SURGERY
Payer: COMMERCIAL

## 2025-07-17 PROCEDURE — 97140 MANUAL THERAPY 1/> REGIONS: CPT

## 2025-07-17 NOTE — PROGRESS NOTES
Patient: Charlotte Avalos (55 year old, female) Referring Provider:  Insurance:   Diagnosis: Cervical radiculopathy at C8 (M54.12) Ramon ALICEA   Date of Surgery: 5/14/25 Next MD visit:  N/A   Precautions:  None 8/7/25 Referral Information:    Date of Evaluation: Req: 0, Auth: 0, Exp:     07/10/25 POC Auth Visits:          Today's Date   7/17/2025    Subjective  The patient reports that her neck is feeling \"terrible\" today, starting this morning. She reports that she is having \"elecrical signals\" down her right arm, when it usually only affects her left side. She denies any increase in activity level or known RICK other than the new exercises from last session.       Pain: 8/10     Objective  Pain at worst: 8/10    Pain at rest (post-tx): 7.5/10      Assessment  Charlotte arrived today with reports of increased pain and radicular symptoms bilaterally, which usually predominantly affects the left side. We focused today's session on MT interventions for improved pain and symptom management. Despite extensive MT work for soft tissue and joint mobility, the patient's pain improved only minimally from 8/10 to 7.5/10 by the end of the session. The patient was advised to modify her activity level and participate in HEP only as tolerated. We will re-assess patient at her next appt and will adjust treatment accordingly.    Goals (to be met in 12 visits)   Short-Term Goals:  The patient will improve cervical flexion AROM to 50deg pain-free to facilitate looking up for completing household chores that require looking down such as cooking, washing dishes, etc.. Timeframe: 6 visits.  The patient will improve cervical rotation AROM to 50deg (B) pain-free to facilitate mobility for turning his/her head to check blindspot while driving. Timeframe: 6-8 visits.  The patient will improve cervical extension AROM to 50 deg pain-free to facilitate looking up for visualization while reaching overhead into upper cabinets.  Timeframe: 6-8 visits.  Long-Term Goals:  Patient will improve cervical neck pain and headache symptoms to facilitate sleeping at least at least 6 hours without interruption due to neck pain for adequate rest. Timeframe: 10-12 visits.  Patient will improve thoracic spine mobility and postural endurance to maintain proper posture with neutral head position while sitting to facilitate 3 hours of pain-free sitting or deskwork. Timeframe: 12 visits.  Patient will improve Neck Disability Index (NDI) score by 10 points or 10% to indicate a true change in improved function for ADL's and restoring PLF. Timeframe: 12 visits.           Plan  Follow up on pain and symptom management and adjust treatment accordingly.    Treatment Last 4 Visits  Treatment Day: 2       7/10/2025 7/15/2025 7/17/2025   Spine Treatment   Therapeutic Exercise Manual upper trap stretch: x 30\" (B)   Supine chin tuck: 2 x 10 x 3\"   Seated scap retraction: 2 x 10 x 3\"   Modified downward dog: 1 x 10 x 3\"   Provided patient with a written copy of exercise instruction which was also documented in patient's electronic medical chart. Educated the patient on the need for consistency with HEP to achieve the mutually established goals.  Reviewed individual insurance plan benefits/coverage and provided patient an appointment list, contact information for the clinic and treating providers and company attendance policy.    Supine Elastic Band Pull Apart x 10; Yellow - hold future appointments, significant [place] compensation   Doorway Stretches, bicep stretches add next visit   Thoracic FR flexion, abduction next visit  Manual upper trap stretch: 2 x 30\" (B)    Neuro Re-Education  Supine Scap Depression 2 x 10 3\" H   Seated Scap Depression x 10 3\" H   Seated Scap Depr--Retraction x 10 3\" H   Supine Scap Depression x 10 3\" H     Manual Therapy Soft tissue mobilization (supine): (B) upper trap, levator, cervical paraspinals   Cervical traction: 3 x 30\"   SOR: x 3min    Cervical upglide joint mobs - C3-C7: Grade 3, 2 x 10\" each (B)  PA Thoracic Mobs T4-T9 x 5 min   Cupping (Prone): Periscap (B)  UT Release (L) x 2 min    Soft tissue mobilization (supine): (B) upper trap, levator, cervical paraspinals   Cervical traction: 3 x 30\"   SOR: x 3min   Cervical upglide joint mobs - C3-C7: Grade 3, 2 x 10\" each (B)   Soft tissue mobilization (prone): (B) thoracic paraspinals, upper trap, levator, parascapular musculature  Thoracic PA accessory joint mobs - T1-T8: Grade 3, 4 x 10\" each    Therapeutic Activity Education on anatomy and pathophysiology of current condition, rationale for physical therapy, anticipated treatment interventions, prognosis, timeline for recovery, and expected functional outcomes based on evaluative findings.      Therapeutic Exercise Minutes 10 5 2   Neuro Re-Educ Minutes  23    Manual Therapy Minutes 15 13 38   Therapeutic Activity Minutes 5     Evaluation Minutes 15     Total Time Of Timed Procedures 30 41 40   Total Time Of Service-Based Procedures 15 0 0   Total Treatment Time 45 41 40   HEP Access Code: [email or web address]  URL: [email or web address]  Date: 07/10/2025  Prepared by: Natalie Flores    Exercises  - Supine Chin Tuck  - 1 x daily - 7 x weekly - 3 sets - 10 reps  - Seated Scapular Retraction  - 1 x daily - 7 x weekly - 2 sets - 10 reps - 2-3\" hold  - Standing Lumbar Spine Flexion Stretch Counter  - 1 x daily - 7 x weekly - 1 sets - 10 reps - 2-3\" hold Access Code: [email or web address]  URL: [email or web address]  Date: 07/15/2025  Prepared by: Rissa Hong    Exercises  - Supine Chin Tuck  - 1 x daily - 7 x weekly - 3 sets - 10 reps  - Seated Scapular Retraction  - 2 x daily - 7 x weekly - 2 sets - 10 reps - 2-3\" hold  - Supine Scapular Depression  - 2 x daily - 7 x weekly - 2 sets - 10 reps - 2-3\" hold         HEP  Access Code: [email or web address]  URL: [email or web address]  Date: 07/15/2025  Prepared by: Rissa  [place]    Exercises  - Supine Chin Tuck  - 1 x daily - 7 x weekly - 3 sets - 10 reps  - Seated Scapular Retraction  - 2 x daily - 7 x weekly - 2 sets - 10 reps - 2-3\" hold  - Supine Scapular Depression  - 2 x daily - 7 x weekly - 2 sets - 10 reps - 2-3\" hold    Charges  MT x 3

## 2025-07-21 ENCOUNTER — APPOINTMENT (OUTPATIENT)
Dept: PHYSICAL THERAPY | Age: 55
End: 2025-07-21
Attending: NEUROLOGICAL SURGERY
Payer: COMMERCIAL

## 2025-07-21 RX ORDER — PANTOPRAZOLE SODIUM 40 MG/1
40 TABLET, DELAYED RELEASE ORAL DAILY
Qty: 90 TABLET | Refills: 1 | Status: SHIPPED | OUTPATIENT
Start: 2025-07-21

## 2025-07-21 NOTE — TELEPHONE ENCOUNTER
Gastrointestional Medication Protocol Passed07/19/2025 11:01 AM   Protocol Details In person appointment or virtual visit in the past 12 mos or appointment in next 3 mos    Medication is active on med list

## 2025-07-24 ENCOUNTER — OFFICE VISIT (OUTPATIENT)
Dept: PHYSICAL THERAPY | Age: 55
End: 2025-07-24
Attending: NEUROLOGICAL SURGERY
Payer: COMMERCIAL

## 2025-07-24 ENCOUNTER — APPOINTMENT (OUTPATIENT)
Dept: PHYSICAL THERAPY | Age: 55
End: 2025-07-24
Attending: NEUROLOGICAL SURGERY
Payer: COMMERCIAL

## 2025-07-24 PROCEDURE — 97140 MANUAL THERAPY 1/> REGIONS: CPT

## 2025-07-24 PROCEDURE — 97110 THERAPEUTIC EXERCISES: CPT

## 2025-07-24 NOTE — PROGRESS NOTES
Patient: Charlotte Avalos (55 year old, female) Referring Provider:  Insurance:   Diagnosis: Cervical radiculopathy at C8 (M54.12) Ramon YUENNA NIXON   Date of Surgery: 5/14/25 Next MD visit:  N/A   Precautions:  None 8/7/25 Referral Information:    Date of Evaluation: Req: 0, Auth: 0, Exp:     07/10/25 POC Auth Visits:          Today's Date   7/24/2025    Subjective  The patient reports that she had her  drive her to her PT appt today. She reports that she took a muscle relaxer and three Motrin \"a little while ago\" because her pain was worse. \"It wasn't anything that I haven't experienced before. Every step I take hurts.\" The patient report shooting pains in her left arm more than her right since her last appt, but not too bad today.       Pain: 5/10     Objective  Pain at worst: 9/10, the patient denied seeking additional medical attention, she managed with a muscle relaxer, gabapentin, and motrin.    Pain at rest, post-tx: 4-4.5/10     Assessment  Charlotte is with slightly improved pain ratings this date, but is still with higher pain ratings than anticipated. The patient is still with limited improvements in pain even after extensive MT interventions. We attempted to progress some postural mobility and strengthening exercises this date, which the patient was able to complete as instructed. We will assess tolerance to exercise progressions next session and will update HEP accordingly.    Goals (to be met in 12 visits)   Short-Term Goals:  The patient will improve cervical flexion AROM to 50deg pain-free to facilitate looking up for completing household chores that require looking down such as cooking, washing dishes, etc.. Timeframe: 6 visits.  The patient will improve cervical rotation AROM to 50deg (B) pain-free to facilitate mobility for turning his/her head to check blindspot while driving. Timeframe: 6-8 visits.  The patient will improve cervical extension AROM to 50 deg pain-free to facilitate  looking up for visualization while reaching overhead into upper cabinets. Timeframe: 6-8 visits.  Long-Term Goals:  Patient will improve cervical neck pain and headache symptoms to facilitate sleeping at least at least 6 hours without interruption due to neck pain for adequate rest. Timeframe: 10-12 visits.  Patient will improve thoracic spine mobility and postural endurance to maintain proper posture with neutral head position while sitting to facilitate 3 hours of pain-free sitting or deskwork. Timeframe: 12 visits.  Patient will improve Neck Disability Index (NDI) score by 10 points or 10% to indicate a true change in improved function for ADL's and restoring PLF. Timeframe: 12 visits.               Plan  Follow up on tolerance to exercise progressions and update HEP accordingly.    Treatment Last 4 Visits  Treatment Day: 3       7/10/2025 7/15/2025 7/17/2025 7/24/2025   Spine Treatment   Therapeutic Exercise Manual upper trap stretch: x 30\" (B)   Supine chin tuck: 2 x 10 x 3\"   Seated scap retraction: 2 x 10 x 3\"   Modified downward dog: 1 x 10 x 3\"   Provided patient with a written copy of exercise instruction which was also documented in patient's electronic medical chart. Educated the patient on the need for consistency with HEP to achieve the mutually established goals.  Reviewed individual insurance plan benefits/coverage and provided patient an appointment list, contact information for the clinic and treating providers and company attendance policy.    Supine Elastic Band Pull Apart x 10; Yellow - hold future appointments, significant [place] compensation   Doorway Stretches, bicep stretches add next visit   Thoracic FR flexion, abduction next visit  Manual upper trap stretch: 2 x 30\" (B)  Manual upper trap stretch: 2 x 30\" (B)  Prone scap retraction: 2 x 10 x 3\" (B)   Supine chin tuck: 2 x 10 x 3\"  Doorway pec stretch: x 30\" (B)   1/2 foam roll against wall - (B) OH flexion and HBH (B) ER: 1 x 10    Neuro  Re-Education  Supine Scap Depression 2 x 10 3\" H   Seated Scap Depression x 10 3\" H   Seated Scap Depr--Retraction x 10 3\" H   Supine Scap Depression x 10 3\" H      Manual Therapy Soft tissue mobilization (supine): (B) upper trap, levator, cervical paraspinals   Cervical traction: 3 x 30\"   SOR: x 3min   Cervical upglide joint mobs - C3-C7: Grade 3, 2 x 10\" each (B)  PA Thoracic Mobs T4-T9 x 5 min   Cupping (Prone): Periscap (B)  UT Release (L) x 2 min    Soft tissue mobilization (supine): (B) upper trap, levator, cervical paraspinals   Cervical traction: 3 x 30\"   SOR: x 3min   Cervical upglide joint mobs - C3-C7: Grade 3, 2 x 10\" each (B)   Soft tissue mobilization (prone): (B) thoracic paraspinals, upper trap, levator, parascapular musculature  Thoracic PA accessory joint mobs - T1-T8: Grade 3, 4 x 10\" each  Soft tissue mobilization (supine): (B) upper trap, levator, cervical paraspinals   Cervical traction: 3 x 30\"   SOR: x 3min   Cervical upglide joint mobs - C3-C7: Grade 3, 2 x 10\" each (B)   Soft tissue mobilization (prone): (B) thoracic paraspinals, upper trap, levator, parascapular musculature   Thoracic PA accessory joint mobs - T1-T8: Grade 3, 4 x 10\" each      Therapeutic Activity Education on anatomy and pathophysiology of current condition, rationale for physical therapy, anticipated treatment interventions, prognosis, timeline for recovery, and expected functional outcomes based on evaluative findings.       Therapeutic Exercise Minutes 10 5 2 10   Neuro Re-Educ Minutes  23     Manual Therapy Minutes 15 13 38 30   Therapeutic Activity Minutes 5      Evaluation Minutes 15      Total Time Of Timed Procedures 30 41 40 40   Total Time Of Service-Based Procedures 15 0 0 0   Total Treatment Time 45 41 40 40   HEP Access Code: [email or web address]  URL: [email or web address]  Date: 07/10/2025  Prepared by: Natalie Flores    Exercises  - Supine Chin Tuck  - 1 x daily - 7 x weekly - 3 sets - 10 reps  -  Seated Scapular Retraction  - 1 x daily - 7 x weekly - 2 sets - 10 reps - 2-3\" hold  - Standing Lumbar Spine Flexion Stretch Counter  - 1 x daily - 7 x weekly - 1 sets - 10 reps - 2-3\" hold Access Code: [email or web address]  URL: [email or web address]  Date: 07/15/2025  Prepared by: Rissa Hal    Exercises  - Supine Chin Tuck  - 1 x daily - 7 x weekly - 3 sets - 10 reps  - Seated Scapular Retraction  - 2 x daily - 7 x weekly - 2 sets - 10 reps - 2-3\" hold  - Supine Scapular Depression  - 2 x daily - 7 x weekly - 2 sets - 10 reps - 2-3\" hold          HEP  Access Code: [email or web address]  URL: [email or web address]/  Date: 07/15/2025  Prepared by: Rissa Hal    Exercises  - Supine Chin Tuck  - 1 x daily - 7 x weekly - 3 sets - 10 reps  - Seated Scapular Retraction  - 2 x daily - 7 x weekly - 2 sets - 10 reps - 2-3\" hold  - Supine Scapular Depression  - 2 x daily - 7 x weekly - 2 sets - 10 reps - 2-3\" hold    Charges  MT x 2, TE x 1                          to walk

## 2025-07-28 ENCOUNTER — OFFICE VISIT (OUTPATIENT)
Dept: PHYSICAL THERAPY | Age: 55
End: 2025-07-28
Attending: NEUROLOGICAL SURGERY
Payer: COMMERCIAL

## 2025-07-28 PROCEDURE — 97112 NEUROMUSCULAR REEDUCATION: CPT

## 2025-07-28 PROCEDURE — 97110 THERAPEUTIC EXERCISES: CPT

## 2025-07-28 PROCEDURE — 97140 MANUAL THERAPY 1/> REGIONS: CPT

## 2025-07-28 NOTE — PROGRESS NOTES
Patient: Charlotte Avalos (55 year old, female) Referring Provider:  Insurance:   Diagnosis: Cervical radiculopathy at C8 (M54.12) Ramon ALICEA   Date of Surgery: 5/14/25 Next MD visit:  N/A   Precautions:  None 8/7/25 Referral Information:    Date of Evaluation: Req: 0, Auth: 0, Exp:     07/10/25 POC Auth Visits:          Today's Date   7/28/2025    Subjective  Patient reports she had some temporary relief of neck pain immediately following last visit, but did increase lower back pain and had to sit forward stretching her back that night. Patient reports neck pain returned the next morning       Pain: 4/10     Objective       Assessment  Patient returns to PT reporting relief after last visit from 5-6/10 to 4/10 by end of session, but with increased low back pain that night. Patient was with return of usual neck pain by the next morning, and was \"back to square one.\" Patient responded well initially to prone STM and thoracic mobilizations, but had increased tension of left side when attempting manual UT stretch this day. Patient care this day focused mainly on manual intervention of thoracic and cervical spine, with emphasis of thoracic mobility which was added to HEP.    Goals (to be met in 12 visits)   Short-Term Goals:  The patient will improve cervical flexion AROM to 50deg pain-free to facilitate looking up for completing household chores that require looking down such as cooking, washing dishes, etc.. Timeframe: 6 visits.  The patient will improve cervical rotation AROM to 50deg (B) pain-free to facilitate mobility for turning his/her head to check blindspot while driving. Timeframe: 6-8 visits.  The patient will improve cervical extension AROM to 50 deg pain-free to facilitate looking up for visualization while reaching overhead into upper cabinets. Timeframe: 6-8 visits.  Long-Term Goals:  Patient will improve cervical neck pain and headache symptoms to facilitate sleeping at least at least 6  hours without interruption due to neck pain for adequate rest. Timeframe: 10-12 visits.  Patient will improve thoracic spine mobility and postural endurance to maintain proper posture with neutral head position while sitting to facilitate 3 hours of pain-free sitting or deskwork. Timeframe: 12 visits.  Patient will improve Neck Disability Index (NDI) score by 10 points or 10% to indicate a true change in improved function for ADL's and restoring PLF. Timeframe: 12 visits.      Plan  Follow up on response to HEP, consider more thoracic mobility and PPT to reduce extensive lumbar lordosis    Treatment Last 4 Visits  Treatment Day: 4       7/15/2025 7/17/2025 7/24/2025 7/28/2025   Spine Treatment   Therapeutic Exercise Supine Elastic Band Pull Apart x 10; Yellow - hold future appointments, significant [place] compensation   Doorway Stretches, bicep stretches add next visit   Thoracic FR flexion, abduction next visit  Manual upper trap stretch: 2 x 30\" (B)  Manual upper trap stretch: 2 x 30\" (B)  Prone scap retraction: 2 x 10 x 3\" (B)   Supine chin tuck: 2 x 10 x 3\"  Doorway pec stretch: x 30\" (B)   1/2 foam roll against wall - (B) OH flexion and HBH (B) ER: 1 x 10  Bicep/Pec Stretch on Wall 2 x 30\" ea R/L  1/2 foam roll against wall - (B) OH flexion and HBH (B) ER: 1 x 10 - max cues to reduce excessive lumbar arching/lordosis  Seated Thor Extension x 10 3\" H   1/2 FR Supine x 2 min    Neuro Re-Education Supine Scap Depression 2 x 10 3\" H   Seated Scap Depression x 10 3\" H   Seated Scap Depr--Retraction x 10 3\" H   Supine Scap Depression x 10 3\" H    Prone Scapular Depression x 10 5\" H   Seated Scap Depression-Retraction x 10 5\" H   Supine Scap Depression with \"Reaching\" x 10 5\" H    Manual Therapy PA Thoracic Mobs T4-T9 x 5 min   Cupping (Prone): Periscap (B)  UT Release (L) x 2 min    Soft tissue mobilization (supine): (B) upper trap, levator, cervical paraspinals   Cervical traction: 3 x 30\"   SOR: x 3min   Cervical  upglide joint mobs - C3-C7: Grade 3, 2 x 10\" each (B)   Soft tissue mobilization (prone): (B) thoracic paraspinals, upper trap, levator, parascapular musculature  Thoracic PA accessory joint mobs - T1-T8: Grade 3, 4 x 10\" each  Soft tissue mobilization (supine): (B) upper trap, levator, cervical paraspinals   Cervical traction: 3 x 30\"   SOR: x 3min   Cervical upglide joint mobs - C3-C7: Grade 3, 2 x 10\" each (B)   Soft tissue mobilization (prone): (B) thoracic paraspinals, upper trap, levator, parascapular musculature   Thoracic PA accessory joint mobs - T1-T8: Grade 3, 4 x 10\" each    Soft tissue mobilization (prone): (B) thoracic paraspinals, upper trap, levator, parascapular musculature   Thoracic PA accessory joint mobs - T1-T8: Grade 3, 4 x 10\" each      Therapeutic Exercise Minutes 5 2 10 13   Neuro Re-Educ Minutes 23   14   Manual Therapy Minutes 13 38 30 17   Total Time Of Timed Procedures 41 40 40 44   Total Time Of Service-Based Procedures 0 0 0 0   Total Treatment Time 41 40 40 44   HEP Access Code: [email or web address]  URL: [email or web address]  Date: 07/15/2025  Prepared by: Rissa Hong    Exercises  - Supine Chin Tuck  - 1 x daily - 7 x weekly - 3 sets - 10 reps  - Seated Scapular Retraction  - 2 x daily - 7 x weekly - 2 sets - 10 reps - 2-3\" hold  - Supine Scapular Depression  - 2 x daily - 7 x weekly - 2 sets - 10 reps - 2-3\" hold   Access Code: [email or web address]  URL: [email or web address]  Date: 07/28/2025  Prepared by: Rissa Kauro    Exercises  - Supine Chin Tuck  - 1 x daily - 7 x weekly - 3 sets - 10 reps  - Supine Scapular Depression  - 2 x daily - 7 x weekly - 2 sets - 10 reps - 2-3\" hold  - Seated Thoracic Self-Mobilization  - 2 x daily - 7 x weekly - 1 sets - 10 reps - 5s hold        HEP  Access Code: [email or web address]  URL: [email or web address]  Date: 07/28/2025  Prepared by: Rissa Hong    Exercises  - Supine Chin Tuck  - 1 x daily - 7 x weekly - 3 sets  - 10 reps  - Supine Scapular Depression  - 2 x daily - 7 x weekly - 2 sets - 10 reps - 2-3\" hold  - Seated Thoracic Self-Mobilization  - 2 x daily - 7 x weekly - 1 sets - 10 reps - 5s hold    Charges  Manual x 1, Neuro x 1, Ther Ex x 1

## 2025-07-31 ENCOUNTER — OFFICE VISIT (OUTPATIENT)
Dept: PHYSICAL THERAPY | Age: 55
End: 2025-07-31
Attending: NEUROLOGICAL SURGERY
Payer: COMMERCIAL

## 2025-07-31 PROCEDURE — 97110 THERAPEUTIC EXERCISES: CPT

## 2025-07-31 PROCEDURE — 97140 MANUAL THERAPY 1/> REGIONS: CPT

## 2025-08-05 ENCOUNTER — APPOINTMENT (OUTPATIENT)
Dept: PHYSICAL THERAPY | Age: 55
End: 2025-08-05
Attending: NEUROLOGICAL SURGERY

## 2025-08-07 ENCOUNTER — TELEPHONE (OUTPATIENT)
Dept: PHYSICAL THERAPY | Facility: HOSPITAL | Age: 55
End: 2025-08-07

## 2025-08-07 ENCOUNTER — APPOINTMENT (OUTPATIENT)
Dept: PHYSICAL THERAPY | Age: 55
End: 2025-08-07
Attending: NEUROLOGICAL SURGERY

## 2025-08-11 ENCOUNTER — APPOINTMENT (OUTPATIENT)
Dept: PHYSICAL THERAPY | Age: 55
End: 2025-08-11
Attending: NEUROLOGICAL SURGERY

## 2025-08-13 ENCOUNTER — OFFICE VISIT (OUTPATIENT)
Dept: SURGERY | Facility: CLINIC | Age: 55
End: 2025-08-13

## 2025-08-13 VITALS — DIASTOLIC BLOOD PRESSURE: 82 MMHG | SYSTOLIC BLOOD PRESSURE: 134 MMHG | OXYGEN SATURATION: 97 % | HEART RATE: 81 BPM

## 2025-08-13 DIAGNOSIS — Z98.890 HX OF CERVICAL SPINE SURGERY: Primary | ICD-10-CM

## 2025-08-13 PROCEDURE — 99213 OFFICE O/P EST LOW 20 MIN: CPT | Performed by: PHYSICIAN ASSISTANT

## 2025-08-14 ENCOUNTER — APPOINTMENT (OUTPATIENT)
Dept: PHYSICAL THERAPY | Age: 55
End: 2025-08-14
Attending: NEUROLOGICAL SURGERY

## 2025-08-18 ENCOUNTER — APPOINTMENT (OUTPATIENT)
Dept: PHYSICAL THERAPY | Age: 55
End: 2025-08-18
Attending: NEUROLOGICAL SURGERY

## 2025-08-20 ENCOUNTER — TELEPHONE (OUTPATIENT)
Dept: SURGERY | Facility: CLINIC | Age: 55
End: 2025-08-20

## (undated) DEVICE — C-ARM: Brand: UNBRANDED

## (undated) DEVICE — GOWN,SIRUS,FABRNF,XL,20/CS: Brand: MEDLINE

## (undated) DEVICE — CODMAN® SURGICAL PATTIES 1/2" X 1/2" (1.27CM X 1.27CM): Brand: CODMAN®

## (undated) DEVICE — SPONGE,NEURO,0.5"X1.5",XR,STRL,LF,10/PK: Brand: MEDLINE

## (undated) DEVICE — MAYFIELD® DISPOSABLE ADULT SKULL PIN (PLASTIC BASE): Brand: MAYFIELD®

## (undated) DEVICE — 4-PORT MANIFOLD: Brand: NEPTUNE 2

## (undated) DEVICE — ZZ-CONVERTED-TO-524825-ADHESIVE SKIN TOP FOR WND CLSR DERMBND ADV

## (undated) DEVICE — COVER,LIGHT,CAMERA,HARD,1/PK,STRL: Brand: MEDLINE

## (undated) DEVICE — ANTISEPTIC 4OZ 70% ISO ALC

## (undated) DEVICE — SUT VCRL 2-0 18IN ABSRB UD CR L26MM CT-2

## (undated) DEVICE — SOLUTION IRRIG 1000ML 0.9% NACL USP BTL

## (undated) DEVICE — GOWN SUR 2XL LEV 4 BLU W/ WHT V NK BND AERO

## (undated) DEVICE — SYRINGE MED 10ML LL TIP W/O SFTY DISP

## (undated) DEVICE — LAMINECTOMY CDS: Brand: MEDLINE INDUSTRIES, INC.

## (undated) DEVICE — SUT COAT VCRL+ 3-0 18IN RB-1 ABSRB VLT ANTIBA

## (undated) DEVICE — MARKER SKIN PREP-RESISTANT ST: Brand: MEDLINE INDUSTRIES, INC.

## (undated) DEVICE — GLOVE,SURG,SENSICARE SLT,LF,PF,8: Brand: MEDLINE

## (undated) DEVICE — SYRINGE MED 20ML STD CLR PLAS LL TIP N CTRL

## (undated) DEVICE — DRAPE,LAPAROTOMY,PCH,STERILE: Brand: MEDLINE

## (undated) DEVICE — CATHETER IV 14GA L5.25IN ANGIOCATH STR FEP

## (undated) DEVICE — SUT VCRL 0 18IN CT-1 ABSRB VLT CR L36MM 1/2

## (undated) DEVICE — SLEEVE COMPR MD KNEE LEN SGL USE KENDALL SCD

## (undated) NOTE — LETTER
Charlotte Avalos   [address] [place] [address]           Dear Charlotte Avalos     Our records indicate that you have outstanding lab work and or testing that was ordered for you and has not yet been completed:   Cervical cancer screening (Pap smear)  Ayesha Helton our nurse practitioner is also available if you would like to schedule your pap smear with a female provider.  To provide you with the best possible care, please complete these orders at your earliest convenience. If you have recently completed these orders please disregard this letter.     If you have any questions please call the office at [phone].     Thank you,     [facility]

## (undated) NOTE — LETTER
25          Charlotte Avalos  :  1970      To Whom it May Concern:      The above patient was seen at the [facility] [place] for treatment of a medical condition.    This patient should be excused from attending work until follow-up on 25. We will re-evaluate at that time. Please do not hesitate to contact our office with questions/concerns at [phone], option 2. Thank you.      Sincerely,    Roaxna Silva PA-C

## (undated) NOTE — LETTER
Charlotte Avalos   [address] [place] [address]           Dear Charlotte Avalos     Our records indicate that you have outstanding lab work and or testing that was ordered for you and has not yet been completed:   Breast cancer screening  To provide you with the best possible care, please complete these orders at your earliest convenience. If you have recently completed these orders please disregard this letter.     If you have any questions please call the office at [phone].     Thank you,     [facility]

## (undated) NOTE — LETTER
25          Charlotte Avalos  :  1970      To Whom It May Concern:    This patient was seen in our office on 25 .  Work status:  Remain off work. May return to work following re-evaluation after completion of physical therapy.    If this office may be of further assistance, please do not hesitate to contact us.      Sincerely,        Roxana Silva

## (undated) NOTE — LETTER
25          Charlotte Avalos  :  1970      To Whom it May Concern:      This letter has been written at the patient's request. The above patient was seen at the [facility] for treatment of a medical condition.    This patient should be excused from attending work until follow up on 25. We will re evaluate at that time. Please do not hesitate to contact our office with questions/concerns, thank you. [phone], option 2.       Sincerely,    Roxana Silva PA-C